# Patient Record
Sex: FEMALE | Race: WHITE | NOT HISPANIC OR LATINO | Employment: FULL TIME | URBAN - METROPOLITAN AREA
[De-identification: names, ages, dates, MRNs, and addresses within clinical notes are randomized per-mention and may not be internally consistent; named-entity substitution may affect disease eponyms.]

---

## 2017-01-03 ENCOUNTER — TELEPHONE (OUTPATIENT)
Dept: GASTROENTEROLOGY | Facility: CLINIC | Age: 62
End: 2017-01-03

## 2017-01-03 NOTE — TELEPHONE ENCOUNTER
----- Message from Bello Broussard MD sent at 1/1/2017 11:38 AM CST -----  Sandi - please tell Christen that her EGD pathology was benign but shows Covarrubias's esophagus and take her PPI as directed and follow up GI clinic once year for Covarrubias's and PPI labs.    SPECIMEN  1) Duodenum, cold biopsy.  2) Stomach, cold biopsy.  3) Distal esophagus at 35 cm, cold biopsy.  4) Esophagus at 33 cm, cold biopsy.  5) Esophagus at 31 cm, cold biopsy.  6) Esophagus at 29 cm, cold biopsy.    FINAL PATHOLOGIC DIAGNOSIS    1. DUODENUM (BIOPSY):  -Duodenal mucosa with no significant pathologic changes  -Negative for active inflammation  -No features of sprue  -No organisms    2. STOMACH (BIOPSY):  -Antrum and body mucosa with no significant pathologic changes  -Negative for active inflammation  -Negative for Helicobacter pylori organisms on H&E stain    3. DISTAL ESOPHAGUS, 35 CM (BIOPSY):  -Gastric-type mucosa with intestinal metaplasia  -Negative for dysplasia    4. DISTAL ESOPHAGUS, 33 CM (BIOPSY):  -Gastric-type mucosa with intestinal metaplasia  -Negative for dysplasia    5. DISTAL ESOPHAGUS, 31 CM (BIOPSY):  -Gastric-type mucosa with intestinal metaplasia  -Squamous mucosa (separate piece) with features of reflux esophagitis  -Negative for dysplasia    6. DISTAL ESOPHAGUS, 29 CM (BIOPSY):  -Gastric-type mucosa with intestinal metaplasia  -Squamous mucosa (separate piece) with features of reflux esophagitis  -Negative for dysplasia    Diagnosed by: Awilda Mendez M.D.

## 2017-01-23 DIAGNOSIS — D50.9 IRON DEFICIENCY ANEMIA: ICD-10-CM

## 2017-01-23 RX ORDER — FERROUS SULFATE 325(65) MG
1 TABLET ORAL EVERY 12 HOURS
Qty: 60 TABLET | Refills: 1 | Status: SHIPPED | OUTPATIENT
Start: 2017-01-23 | End: 2017-01-23 | Stop reason: SDUPTHER

## 2017-01-23 RX ORDER — FERROUS SULFATE 325(65) MG
1 TABLET ORAL EVERY 12 HOURS
Qty: 60 TABLET | Refills: 1 | Status: SHIPPED | OUTPATIENT
Start: 2017-01-23 | End: 2017-08-04 | Stop reason: SDUPTHER

## 2017-03-21 DIAGNOSIS — D50.9 IRON DEFICIENCY ANEMIA: ICD-10-CM

## 2017-03-22 RX ORDER — FERROUS SULFATE 325(65) MG
1 TABLET ORAL EVERY 12 HOURS
Qty: 60 TABLET | Refills: 3 | Status: SHIPPED | OUTPATIENT
Start: 2017-03-22 | End: 2020-04-29

## 2017-07-31 DIAGNOSIS — D50.9 IRON DEFICIENCY ANEMIA: ICD-10-CM

## 2017-07-31 RX ORDER — FERROUS SULFATE 325(65) MG
TABLET ORAL
Qty: 60 TABLET | OUTPATIENT
Start: 2017-07-31

## 2017-08-05 RX ORDER — FERROUS SULFATE 325(65) MG
1 TABLET ORAL EVERY 12 HOURS
Qty: 60 TABLET | Refills: 1 | Status: SHIPPED | OUTPATIENT
Start: 2017-08-05 | End: 2017-11-22 | Stop reason: SDUPTHER

## 2017-11-22 ENCOUNTER — TELEPHONE (OUTPATIENT)
Dept: PHARMACY | Facility: HOSPITAL | Age: 62
End: 2017-11-22

## 2017-11-22 NOTE — TELEPHONE ENCOUNTER
Informed patient [facility] received a prescription for Enbrel and we will contact their insurance company to find out if the medication is covered. We will update patient of status as more information is received. feel free to give us a call with  any questions at [phone].

## 2017-12-12 NOTE — TELEPHONE ENCOUNTER
FOR DOCUMENTATION ONLY:  Previous Prior authorization on file for Enbrel expires 3/26/18.   $0 copay

## 2018-01-23 ENCOUNTER — TELEPHONE (OUTPATIENT)
Dept: PHARMACY | Facility: CLINIC | Age: 63
End: 2018-01-23

## 2018-02-20 ENCOUNTER — TELEPHONE (OUTPATIENT)
Dept: PHARMACY | Facility: CLINIC | Age: 63
End: 2018-02-20

## 2018-03-20 ENCOUNTER — TELEPHONE (OUTPATIENT)
Dept: PHARMACY | Facility: CLINIC | Age: 63
End: 2018-03-20

## 2018-03-20 NOTE — TELEPHONE ENCOUNTER
Refill readiness for Enbrel confirmed with patient; name/ confirmed; no missed doses; no new medications; no side effects noted; address confirmed for  shipment and  delivery.

## 2018-03-23 PROBLEM — D50.9 IRON (FE) DEFICIENCY ANEMIA: Status: ACTIVE | Noted: 2018-03-23

## 2018-04-12 ENCOUNTER — TELEPHONE (OUTPATIENT)
Dept: PHARMACY | Facility: CLINIC | Age: 63
End: 2018-04-12

## 2018-05-11 NOTE — TELEPHONE ENCOUNTER
FOR DOCUMENTATION:  Enbrel renewal prior authorization approved.   5/11/18 through 12/31/18  Case ID: [id number]

## 2018-05-15 ENCOUNTER — TELEPHONE (OUTPATIENT)
Dept: PHARMACY | Facility: CLINIC | Age: 63
End: 2018-05-15

## 2018-06-11 ENCOUNTER — TELEPHONE (OUTPATIENT)
Dept: PHARMACY | Facility: CLINIC | Age: 63
End: 2018-06-11

## 2018-07-10 ENCOUNTER — TELEPHONE (OUTPATIENT)
Dept: PHARMACY | Facility: CLINIC | Age: 63
End: 2018-07-10

## 2018-08-06 ENCOUNTER — TELEPHONE (OUTPATIENT)
Dept: PHARMACY | Facility: CLINIC | Age: 63
End: 2018-08-06

## 2018-08-06 NOTE — TELEPHONE ENCOUNTER
Enbrel refill. $0 (004). Address confirmed. Shipping out 8/7/18.   F/U: No issues with administration. Rotating injection sites. Storage reviewed. Using sharps container, not in need of a new one at this time. No side effects to report. Injects every Wednesday.

## 2018-08-31 ENCOUNTER — TELEPHONE (OUTPATIENT)
Dept: PHARMACY | Facility: CLINIC | Age: 63
End: 2018-08-31

## 2018-09-27 ENCOUNTER — TELEPHONE (OUTPATIENT)
Dept: PHARMACY | Facility: CLINIC | Age: 63
End: 2018-09-27

## 2018-10-23 ENCOUNTER — TELEPHONE (OUTPATIENT)
Dept: PHARMACY | Facility: CLINIC | Age: 63
End: 2018-10-23

## 2018-11-20 ENCOUNTER — TELEPHONE (OUTPATIENT)
Dept: PHARMACY | Facility: CLINIC | Age: 63
End: 2018-11-20

## 2018-12-20 ENCOUNTER — TELEPHONE (OUTPATIENT)
Dept: PHARMACY | Facility: CLINIC | Age: 63
End: 2018-12-20

## 2019-02-13 ENCOUNTER — TELEPHONE (OUTPATIENT)
Dept: PHARMACY | Facility: CLINIC | Age: 64
End: 2019-02-13

## 2019-03-12 ENCOUNTER — TELEPHONE (OUTPATIENT)
Dept: PHARMACY | Facility: CLINIC | Age: 64
End: 2019-03-12

## 2019-03-18 NOTE — TELEPHONE ENCOUNTER
Patient called to refill Enbrel.  Patient confirmed she has 2 doses left, her next injection is 3/20.  Ship 3/25 for 3/26 delivery.  Copay $0.00  in 004.  Patient confirmed no new meds, allergies, or health conditions.  Verified address.  Declined [facility] . [address]

## 2019-04-16 ENCOUNTER — TELEPHONE (OUTPATIENT)
Dept: PHARMACY | Facility: CLINIC | Age: 64
End: 2019-04-16

## 2019-05-22 ENCOUNTER — TELEPHONE (OUTPATIENT)
Dept: PHARMACY | Facility: CLINIC | Age: 64
End: 2019-05-22

## 2019-06-19 ENCOUNTER — TELEPHONE (OUTPATIENT)
Dept: PHARMACY | Facility: CLINIC | Age: 64
End: 2019-06-19

## 2019-07-17 PROBLEM — I35.1 AORTIC VALVE REGURGITATION: Status: ACTIVE | Noted: 2019-07-17

## 2019-07-17 PROBLEM — G47.30 SLEEP APNEA: Status: ACTIVE | Noted: 2019-07-17

## 2019-10-09 ENCOUNTER — TELEPHONE (OUTPATIENT)
Dept: PHARMACY | Facility: CLINIC | Age: 64
End: 2019-10-09

## 2019-10-09 NOTE — TELEPHONE ENCOUNTER
Incoming call for Enbrel refill and follow up. Pt confirmed shipping of Enbrel on 10/10 to arrive on 10/11. Address and  verified. $0 copay in 004. Pt is not in need of a sharps container at this time.  Pt has 0 doses on hand, next dose due 10/16. Pt reported no missed doses. Pt denies any injection site reactions or side effects. Pt did not start any new medications. Pt had no further questions or concerns.

## 2019-10-30 ENCOUNTER — TELEPHONE (OUTPATIENT)
Dept: PHARMACY | Facility: CLINIC | Age: 64
End: 2019-10-30

## 2019-11-05 ENCOUNTER — TELEPHONE (OUTPATIENT)
Dept: PHARMACY | Facility: CLINIC | Age: 64
End: 2019-11-05

## 2019-12-04 ENCOUNTER — TELEPHONE (OUTPATIENT)
Dept: PHARMACY | Facility: CLINIC | Age: 64
End: 2019-12-04

## 2019-12-24 ENCOUNTER — OFFICE VISIT (OUTPATIENT)
Dept: URGENT CARE | Facility: CLINIC | Age: 64
End: 2019-12-24
Payer: MEDICARE

## 2019-12-24 VITALS
WEIGHT: 178 LBS | OXYGEN SATURATION: 96 % | DIASTOLIC BLOOD PRESSURE: 77 MMHG | TEMPERATURE: 97 F | HEART RATE: 74 BPM | RESPIRATION RATE: 20 BRPM | BODY MASS INDEX: 34.95 KG/M2 | HEIGHT: 60 IN | SYSTOLIC BLOOD PRESSURE: 167 MMHG

## 2019-12-24 DIAGNOSIS — M54.50 ACUTE MIDLINE LOW BACK PAIN WITHOUT SCIATICA: ICD-10-CM

## 2019-12-24 DIAGNOSIS — S13.9XXA NECK SPRAIN, INITIAL ENCOUNTER: Primary | ICD-10-CM
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
## 2019-12-24 PROCEDURE — 72100 X-RAY EXAM L-S SPINE 2/3 VWS: CPT | Mod: S$GLB,,, | Performed by: RADIOLOGY

## 2019-12-24 PROCEDURE — 96372 PR INJECTION,THERAP/PROPH/DIAG2ST, IM OR SUBCUT: ICD-10-PCS | Mod: S$GLB,,, | Performed by: FAMILY MEDICINE

## 2019-12-24 PROCEDURE — 72100 XR LUMBAR SPINE 2 OR 3 VIEWS: ICD-10-PCS | Mod: S$GLB,,, | Performed by: RADIOLOGY

## 2019-12-24 PROCEDURE — 72040 X-RAY EXAM NECK SPINE 2-3 VW: CPT | Mod: S$GLB,,, | Performed by: RADIOLOGY

## 2019-12-24 PROCEDURE — 96372 THER/PROPH/DIAG INJ SC/IM: CPT | Mod: S$GLB,,, | Performed by: FAMILY MEDICINE

## 2019-12-24 PROCEDURE — 99214 OFFICE O/P EST MOD 30 MIN: CPT | Mod: 25,S$GLB,, | Performed by: FAMILY MEDICINE

## 2019-12-24 PROCEDURE — 72040 XR CERVICAL SPINE 2 OR 3 VIEWS: ICD-10-PCS | Mod: S$GLB,,, | Performed by: RADIOLOGY

## 2019-12-24 PROCEDURE — 99214 PR OFFICE/OUTPT VISIT, EST, LEVL IV, 30-39 MIN: ICD-10-PCS | Mod: 25,S$GLB,, | Performed by: FAMILY MEDICINE

## 2019-12-24 RX ORDER — KETOROLAC TROMETHAMINE 30 MG/ML
30 INJECTION, SOLUTION INTRAMUSCULAR; INTRAVENOUS ONCE
Status: COMPLETED | OUTPATIENT
Start: 2019-12-24 | End: 2019-12-24

## 2019-12-24 RX ORDER — MELOXICAM 7.5 MG/1
7.5 TABLET ORAL DAILY
Qty: 20 TABLET | Refills: 0 | Status: SHIPPED | OUTPATIENT
Start: 2019-12-24 | End: 2020-01-13

## 2019-12-24 RX ORDER — CHLORZOXAZONE 500 MG/1
500 TABLET ORAL 4 TIMES DAILY PRN
Qty: 40 TABLET | Refills: 0 | Status: SHIPPED | OUTPATIENT
Start: 2019-12-24 | End: 2020-01-03

## 2019-12-24 RX ORDER — TRAMADOL HYDROCHLORIDE 50 MG/1
50 TABLET ORAL EVERY 6 HOURS PRN
Qty: 20 TABLET | Refills: 0 | Status: SHIPPED | OUTPATIENT
Start: 2019-12-24 | End: 2020-04-29

## 2019-12-24 RX ADMIN — KETOROLAC TROMETHAMINE 30 MG: 30 INJECTION, SOLUTION INTRAMUSCULAR; INTRAVENOUS at 01:12

## 2019-12-24 NOTE — PATIENT INSTRUCTIONS
Please drink plenty of fluids.  Please get plenty of rest.  Please return here or go to the Emergency Department for any concerns or worsening of condition.  If you were prescribed a narcotic medication, do not drive or operate heavy equipment or machinery while taking these medications.  If you were not prescribed an anti-inflammatory medication, and if you do not have any history of stomach/intestinal ulcers, or kidney disease, or are not taking a blood thinner such as Coumadin, Plavix, Pradaxa, Eloquis, or Xaralta for example, it is OK to take over the counter Ibuprofen or Advil or Motrin or Aleve as directed.  Do not take these medications on an empty stomach.  If you lose control of your bowel and/or bladder, please go to the nearest Emergency Department immediately.  If you lose sensation in between your legs by your genitalia and/or rectum, please go to the nearest Emergency Department immediately.  If you lose control or sensation of any extremity, please go to the nearest Emergency Department immediately.    Moist heat (heating Pad) several times a day to back for relief and comfort.  If you  smoke, please stop smoking.    Please follow up with your primary care doctor or specialist as needed.  Manuel Alvarez MD  [phone]    You must understand that you have received treatment at an Urgent Care facility only, and that you may be  released before all of your medical problems are known or treated. Urgent Care facilities are not equipped to  handle life threatening emergencies. It is recommended that you seek care at an Emergency Department for  further evaluation of worsening or concerning symptoms, or possibly life threatening conditions as  Discussed.    General Neck and Back Pain    Both neck and back pain are usually caused by injury to the muscles or ligaments of the spine. Sometimes the disks that separate each bone of the spine may cause pain by pressing on a nearby nerve. Back and neck pain may  appear after a sudden twisting or bending force (such as in a car accident), or sometimes after a simple awkward movement. In either case, muscle spasm is often present and adds to the pain.  Acute neck and back pain usually gets better in 1 to 2 weeks. Pain related to disk disease, arthritis in the spinal joints or spinal stenosis (narrowing of the spinal canal) can become chronic and last for months or years.  Back and neck pain are common problems. Most people feel better in 1 or 2 weeks, and most of the rest in 1 to 2 months. Most people can remain active.  People experience and describe pain differently.  · Pain can be sharp, stabbing, shooting, aching, cramping, or burning  · Movement, standing, bending, lifting, sitting, or walking may worsen the pain  · Pain can be localized to one spot or area, or it can be more generalized  · Pain can spread or radiate upwards, downwards, to the front, or go down your arms  · Muscle spasm may occur.  Most of the time mechanical problems with the muscles or spine cause the pain. it is usually caused by an injury, whether known or not, to the muscles or ligaments. While illnesses can cause back pain, it is usually not caused by a serious illness. Pain is usually related to physical activity, whether sports, exercise, work, or normal activity. Sometimes it can occur without an identifiable cause. This can happen simply by stretching or moving wrong, without noting pain at the time. Other causes include:  · Overexertion, lifting, pushing, pulling incorrectly or too aggressively.  · Sudden twisting, bending or stretching from an accident (car or fall), or accidental movement.  · Poor posture  · Poor conditioning, lack of regular exercise  · Spinal disc disease or arthritis  · Stress  · Pregnancy, or illness like appendicitis, bladder or kidney infection, pelvic infections   Home care  · For neck pain: Use a comfortable pillow that supports the head and keeps the spine in a  neutral position. The position of the head should not be tilted forward or backward.  · When in bed, try to find a position of comfort. A firm mattress is best. Try lying flat on your back with pillows under your knees. You can also try lying on your side with your knees bent up towards your chest and a pillow between your knees.  · At first, do not try to stretch out the sore spots. If there is a strain, it is not like the good soreness you get after exercising without an injury. In this case, stretching may make it worse.  · Avoid prolonged sitting, long car rides or travel. This puts more stress on the lower back than standing or walking.  · During the first 24 to 72 hours after an injury, apply an ice pack to the painful area for 20 minutes and then remove it for 20 minutes over a period of 60 to 90 minutes or several times a day.   · You can alternate ice and heat therapies. Talk with your healthcare provider about the best treatment for your back or neck pain. As a safety precaution, do not use a heating pad at bedtime. Sleeping with a heating pad can lead to skin burns or tissue damage.  · Therapeutic massage can help relax the back and neck muscles without stretching them.  · Be aware of safe lifting methods and do not lift anything over 15 pounds until all the pain is gone.  Medications  Talk to your healthcare provider before using medicine, especially if you have other medical problems or are taking other medicines.  · You may use over-the-counter medicine to control pain, unless another pain medicine was prescribed. If you have chronic conditions like diabetes, liver or kidney disease, stomach ulcers,  gastrointestinal bleeding, or are taking blood thinner medicines.  · Be careful if you are given pain medicines, narcotics, or medicine for muscle spasm. They can cause drowsiness, and can affect your coordination, reflexes, and judgment. Do not drive or operate heavy machinery.  Follow-up care  Follow up  with your healthcare provider, or as advised. Physical therapy or further tests may be needed.  If X-rays were taken, you will be notified of any new findings that may affect your care.  Call 911  Seek emergency medical care if any of the following occur:  · Trouble breathing  · Confusion  · Very drowsy or trouble awakening  · Fainting or loss of consciousness  · Rapid or very slow heart rate  · Loss of bowel or bladder control  When to seek medical advice  Call your healthcare provider right away if any of these occur:  · Pain becomes worse or spreads into your arms or legs  · Weakness, numbness or pain in one or both arms or legs  · Numbness in the groin area  · Difficulty walking  · Fever of 100.4ºF (38ºC) or higher, or as directed by your healthcare provider  Date Last Reviewed: 7/1/2016 © 2000-2016 [employer]. [address] [place] [id number]. All rights reserved. This information is not intended as a substitute for professional medical care. Always follow your healthcare professional's instructions.      Back Pain (Acute or Chronic)    Back pain is one of the most common problems. The good news is that most people feel better in 1 to 2 weeks, and most of the rest in 1 to 2 months. Most people can remain active.  People experience and describe pain differently; not everyone is the same.  · The pain can be sharp, stabbing, shooting, aching, cramping or burning.  · Movement, standing, bending, lifting, sitting, or walking may worsen pain.  · It can be localized to one spot or area, or it can be more generalized.  · It can spread or radiate upwards, to the front, or go down your arms or legs (sciatica).  · It can cause muscle spasm.  Most of the time, mechanical problems with the muscles or spine cause the pain. Mechanical problems are usually caused by an injury to the muscles or ligaments. While illness can cause back pain, it is usually not caused by a serious illness. Mechanical  problems include:   · Physical activity such as sports, exercise, work, or normal activity  · Overexertion, lifting, pushing, pulling incorrectly or too aggressively  · Sudden twisting, bending, or stretching from an accident, or accidental movement  · Poor posture  · Stretching or moving wrong, without noticing pain at the time  · Poor coordination, lack of regular exercise (check with your doctor about this)  · Spinal disc disease or arthritis  · Stress  Pain can also be related to pregnancy, or illness like appendicitis, bladder or kidney infections, pelvic infections, and many other things.  Acute back pain usually gets better in 1 to 2 weeks. Back pain related to disk disease, arthritis in the spinal joints or spinal stenosis (narrowing of the spinal canal) can become chronic and last for months or years.  Unless you had a physical injury (for example, a car accident or fall) X-rays are usually not needed for the initial evaluation of back pain. If pain continues and does not respond to medical treatment, X-rays and other tests may be needed.  Home care  Try these home care recommendations:  · When in bed, try to find a position of comfort. A firm mattress is best. Try lying flat on your back with pillows under your knees. You can also try lying on your side with your knees bent up towards your chest and a pillow between your knees.  · At first, do not try to stretch out the sore spots. If there is a strain, it is not like the good soreness you get after exercising without an injury. In this case, stretching may make it worse.  · Avoid prolong sitting, long car rides, or travel. This puts more stress on the lower back than standing or walking.  · During the first 24 to 72 hours after an acute injury or flare up of chronic back pain, apply an ice pack to the painful area for 20 minutes and then remove it for 20 minutes. Do this over a period of 60 to 90 minutes or several times a day. This will reduce swelling  and pain. Wrap the ice pack in a thin towel or plastic to protect your skin.  · You can start with ice, then switch to heat. Heat (hot shower, hot bath, or heating pad) reduces pain and works well for muscle spasms. Heat can be applied to the painful area for 20 minutes then remove it for 20 minutes. Do this over a period of 60 to 90 minutes or several times a day. Do not sleep on a heating pad. It can lead to skin burns or tissue damage.  · You can alternate ice and heat therapy. Talk with your doctor about the best treatment for your back pain.  · Therapeutic massage can help relax the back muscles without stretching them.  · Be aware of safe lifting methods and do not lift anything without stretching first.  Medicines  Talk to your doctor before using medicine, especially if you have other medical problems or are taking other medicines.  · You may use over-the-counter medicine as directed on the bottle to control pain, unless another pain medicine was prescribed. If you have chronic conditions like diabetes, liver or kidney disease, stomach ulcers, or gastrointestinal bleeding, or are taking blood thinners, talk to your doctor before taking any medicine.  · Be careful if you are given a prescription medicines, narcotics, or medicine for muscle spasms. They can cause drowsiness, affect your coordination, reflexes, and judgement. Do not drive or operate heavy machinery.  Follow-up care  Follow up with your healthcare provider, or as advised.   A radiologist will review any X-rays that were taken. Your provide will notify you of any new findings that may affect your care.  Call 911  Call emergency services if any of the following occur:  · Trouble breathing  · Confusion  · Very drowsy or trouble awakening  · Fainting or loss of consciousness  · Rapid or very slow heart rate  · Loss of bowel or bladder control  When to seek medical advice  Call your healthcare provider right away if any of these occur:   · Pain  becomes worse or spreads to your legs  · Weakness or numbness in one or both legs  · Numbness in the groin or genital area  Date Last Reviewed: 7/1/2016 © 2000-2016 [employer], "[employer]. [address] [place] [address], [place] [address]. All rights reserved. This information is not intended as a substitute for professional medical care. Always follow your healthcare professional's instructions.      Back Care Tips    Caring for your back  These are things you can do to prevent a recurrence of acute back pain and to reduce symptoms from chronic back pain:  · Maintain a healthy weight. If you are overweight, losing weight will help most types of back pain.  · Exercise is an important part of recovery from most types of back pain. The muscles behind and in front of the spine support the back. This means strengthening both the back muscles and the abdominal muscles will provide better support for your spine.   · Swimming and brisk walking are good overall exercises to improve your fitness level.  · Practice safe lifting methods (below).  · Practice good posture when sitting, standing and walking. Avoid prolonged sitting. This puts more stress on the lower back than standing or walking.  · Wear quality shoes with sufficient arch support. Foot and ankle alignment can affect back symptoms. Women should avoid wearing high heels.  · Therapeutic massage can help relax the back muscles without stretching them.  · During the first 24 to 72 hours after an acute injury or flare-up of chronic back pain, apply an ice pack to the painful area for 20 minutes and then remove it for 20 minutes, over a period of 60 to 90 minutes, or several times a day. As a safety precaution, do not use a heating pad at bedtime. Sleeping on a heating pad can lead to skin burns or tissue damage.  · You can alternate ice and heat therapies.  Medications  Talk to your healthcare provider before using medicines, especially if you have other medical problems  or are taking other medicines.  · You may use acetaminophen or ibuprofen to control pain, unless your healthcare provider prescribed other pain medicine. If you have chronic conditions like diabetes, liver or kidney disease, stomach ulcers, or gastrointestinal bleeding, or are taking blood thinners, talk with your healthcare provider before taking any medicines.  · Be careful if you are given prescription pain medicines, narcotics, or medicine for muscle spasm. They can cause drowsiness, affect your coordination, reflexes, and judgment. Do not drive or operate heavy machinery while taking these types of medicines. Take prescription pain medicine only as prescribed by your healthcare provider.  Lumbar stretch  Here is a simple stretching exercise that will help relax muscle spasm and keep your back more limber. If exercise makes your back pain worse, dont do it.  · Lie on your back with your knees bent and both feet on the ground.  · Slowly raise your left knee to your chest as you flatten your lower back against the floor. Hold for 5 seconds.  · Relax and repeat the exercise with your right knee.  · Do 10 of these exercises for each leg.  Safe lifting method  · Dont bend over at the waist to lift an object off the floor.  Instead, bend your knees and hips in a squat.   · Keep your back and head upright  · Hold the object close to your body, directly in front of you.  · Straighten your legs to lift the object.   · Lower the object to the floor in the reverse fashion.  · If you must slide something across the floor, push it.  Posture tips  Sitting  Sit in chairs with straight backs or low-back support. Keep your knees lower than your hips, with your feet flat on the floor.  When driving, sit up straight. Adjust the seat forward so you are not leaning toward the steering wheel.  A small pillow or rolled towel behind your lower back may help if you are driving long distances.   Standing  When standing for long  periods, shift most of your weight to one leg at a time. Alternate legs every few minutes.   Sleeping  The best way to sleep is on your side with your knees bent. Put a low pillow under your head to support your neck in a neutral spine position. Avoid thick pillows that bend your neck to one side. Put a pillow between your legs to further relax your lower back. If you sleep on your back, put pillows under your knees to support your legs in a slightly flexed position. Use a firm mattress. If your mattress sags, replace it, or use a 1/2-inch plywood board under the mattress to add support.  Follow-up care  Follow up with your healthcare provider, or as advised.  If X-rays, a CT scan or an MRI scan were taken, they will be reviewed by a radiologist. You will be notified of any new findings that may affect your care.  Call 911  Seek emergency medical care if any of the following occur:  · Trouble breathing  · Confusion  · Very drowsy  · Fainting or loss of consciousness  · Rapid or very slow heart rate  · Loss of  bowel or bladder control  When to seek medical care  Call your healthcare provider if any of the following occur:  · Pain becomes worse or spreads to your arms or legs  · Weakness or numbness in one or both arms or legs  · Numbness in the groin area  Date Last Reviewed: 6/1/2016  © 4373-5775 [employer]. [address] [place], [place] [id number]. All rights reserved. This information is not intended as a substitute for professional medical care. Always follow your healthcare professional's instructions.              Neck Sprain or Strain  A sudden force that causes turning or bending of the neck can cause sprain or strain. An example would be the force from a car accident. This can stretch or tear muscles called a strain. It can also stretch or tear ligaments called a sprain. Either of these can cause neck pain. Sometimes neck pain occurs after a simple awkward movement. In either case, muscle  spasm is commonly present and contributes to the pain.     Unless you had a forceful physical injury (for example, a car accident or fall), X-rays are usually not ordered for the initial evaluation of neck pain. If pain continues and dose not respond to medical treatment, X-rays and other tests may be performed at a later time.  Home care  · You may feel more soreness and spasm the first few days after the injury. Rest until symptoms begin to improve.  · When lying down, use a comfortable pillow or a rolled towel that supports the head and keeps the spine in a neutral position. The position of the head should not be tilted forward or backward.  · Apply an ice pack over the injured area for 15 to 20 minutes every 3 to 6 hours. You should do this for the first 24 to 48 hours. You can make an ice pack by filling a plastic bag that seals at the top with ice cubes and then wrapping it with a thin towel. After 48 hours, apply heat (warm shower or warm bath) for 15 to 20 minutes several times a day, or alternate ice and heat.  · You may use over-the-counter pain medicine to control pain, unless another pain medicine was prescribed. If you have chronic liver or kidney disease or ever had a stomach ulcer or GI bleeding, talk with your healthcare provider before using these medicines.  · If a soft cervical collar was prescribed, it should be worn only for periods of increased pain. It should not be worn for more than 3 hours a day, or for a period longer than 1 to 2 weeks.  Follow-up care  Follow up with your healthcare provider as directed. Physical therapy may be needed.  Sometimes fractures dont show up on the first X-ray. Bruises and sprains can sometimes hurt as much as a fracture. These injuries can take time to heal completely. If your symptoms dont improve or they get worse, talk with your healthcare provider. You may need a repeat X-ray or other tests. If X-rays were taken, you will be told of any new findings that  may affect your care.  Call 911  Call 911 if you have:  · Neck swelling, difficulty or painful swallowing  · Difficulty breathing  · Chest pain  When to seek medical advice  Call your healthcare provider right away if any of these occur:  · Pain becomes worse or spreads into your arms  · Weakness or numbness in one or both arms  Date Last Reviewed: 11/19/2015 © 2000-2017 [employer]. [address] [place] [address], [place] [address]. All rights reserved. This information is not intended as a substitute for professional medical care. Always follow your healthcare professional's instructions.      Motor Vehicle Accident: General Precautions  Strong forces may be involved in a car accident. It is important to watch for any new symptoms that may signal hidden injury.  It is normal to feel sore and tight in your muscles and back the next day, and not just the muscles you initially injured. Remember, all the parts of your body are connected, so while initially one area hurts, the next day another may hurt. Also, when you injure yourself, it causes inflammation, which then causes the muscles to tighten up and hurt more. After the initial worsening, it should gradually improve over the next few days. However, more severe pain should be reported.  Even without a definite head injury, you can still get a concussion from your head suddenly jerking forward, backward or sideways when falling. Concussions and even bleeding can still occur, especially if you have had a recent injury or take blood thinner. It is common to have a mild headache and feel tired and even nauseous or dizzy.  A motor vehicle accident, even a minor one, can be very stressful and cause emotional or mental symptoms after the event. These may include:  · General sense of anxiety and fear  · Recurring thoughts or nightmares about the accident  · Trouble sleeping or changes in appetite  · Feeling depressed, sad or low in energy  · Irritable or easily  upset  · Feeling the need to avoid activities, places or people that remind you of the accident  In most cases, these are normal reactions and are not severe enough to get in the way of your usual activities. These feelings usually go away within a few days, or sometimes after a few weeks.  Home care  Muscle pain, sprains and strains  Even if you have no visible injury, it is not unusual to be sore all over, and have new aches and pains the first couple of days after an accident. Take it easy at first, and don't over do it.   · Initially, do not try to stretch out the sore spots. If there is a strain, stretching may make it worse. Massage may help relax the muscles without stretching them.  · You can use an ice pack or cold compress on and off to the sore spots 10 to 20 minutes at a time, as often as you feel comfortable. This may help reduce the inflammation, swelling and pain.  You can make an ice pack by wrapping a plastic bag of ice cubes or crushed ice in a thin towel or using a bag of frozen peas or corn.  Wound care  · If you have any scrapes or abrasions, they usually heal within 10 days. It is important to keep the abrasions clean while they first start to heal. However, an infection may occur even with proper care, so watch for early signs of infection such as:  ¨ Increasing redness or swelling around the wound  ¨ Increased warmth of the wound  ¨ Red streaking lines away from the wound  ¨ Draining pus  Medications  · Talk to your doctor before taking new medicines, especially if you have other medical problems or are taking other medicines.  · If you need anything for pain, you can take acetaminophen or ibuprofen, unless you were given a different pain medicine to use. Talk with your doctor before using these medicines if you have chronic liver or kidney disease, or ever had a stomach ulcer or gastrointestinal bleeding, or are taking blood thinner medicines.  · Be careful if you are given prescription pain  medicines, narcotics, or medicine for muscle spasm. They can make you sleepy, dizzy and can affect your coordination, reflexes and judgment. Do not drive or do work where you can injure yourself when taking them.  Follow-up care  Follow up with your healthcare provider, or as advised. If emotional or mental symptoms last more than 3 weeks, follow up with your doctor. You may have a more serious traumatic stress reaction. There are treatments that can help.  If X-rays or CT scans were done, you will be notified if there are any concerns that affect your treatment.  Call 911  Call 911 if any of these occur:  · Trouble breathing  · Confused or difficulty arousing  · Fainting or loss of consciousness  · Rapid heart rate  · Trouble with speech or vision, weakness of an arm or leg  · Trouble walking or talking, loss of balance, numbness or weakness in one side of your body, facial droop  When to seek medical advice  Call your healthcare provider right away if any of the following occur:  · New or worsening headache or vision problems  · New or worsening neck, back, abdomen, arm or leg pain  · Nausea or vomiting  · Dizziness or vertigo  · Redness, swelling, or pus coming from any wound  Date Last Reviewed: 11/5/2015  © 8261-6915 [employer]. [address] [place] [address], [place] [address]. All rights reserved. This information is not intended as a substitute for professional medical care. Always follow your healthcare professional's instructions.
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
## 2019-12-27 ENCOUNTER — TELEPHONE (OUTPATIENT)
Dept: URGENT CARE | Facility: CLINIC | Age: 64
End: 2019-12-27

## 2019-12-27 NOTE — TELEPHONE ENCOUNTER
Called PT about visit on 12/24/19- PT said she is still feeling pain in her lower back, shoulders, and neck. She says she also still has a headache. She said as soon as the holidays are over she will be following up with her primary doctor.

## 2020-01-03 ENCOUNTER — TELEPHONE (OUTPATIENT)
Dept: PHARMACY | Facility: CLINIC | Age: 65
End: 2020-01-03
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
## 2020-02-26 ENCOUNTER — TELEPHONE (OUTPATIENT)
Dept: PHARMACY | Facility: CLINIC | Age: 65
End: 2020-02-26

## 2020-02-26 NOTE — TELEPHONE ENCOUNTER
Incoming call regarding Enbrel from [facility]. Shipping out Enbrel on  for  arrival with patients consent. Copay of $0 @ 004. Address and  confirmed.

## 2020-03-24 ENCOUNTER — TELEPHONE (OUTPATIENT)
Dept: PHARMACY | Facility: CLINIC | Age: 65
End: 2020-03-24

## 2020-03-24 NOTE — TELEPHONE ENCOUNTER
Confirmed Enbrel shipment 3/25 to arrive to patient home 3/26. Patient stated that she had one dose on hand for this Wednesday, 3/25 injection and no missed doses in the past month. Address and  verified. $0 copay (004).

## 2020-04-21 ENCOUNTER — TELEPHONE (OUTPATIENT)
Dept: PHARMACY | Facility: CLINIC | Age: 65
End: 2020-04-21

## 2020-05-19 ENCOUNTER — PATIENT MESSAGE (OUTPATIENT)
Dept: PHARMACY | Facility: CLINIC | Age: 65
End: 2020-05-19

## 2020-05-19 ENCOUNTER — TELEPHONE (OUTPATIENT)
Dept: PHARMACY | Facility: CLINIC | Age: 65
End: 2020-05-19

## 2020-06-09 ENCOUNTER — TELEPHONE (OUTPATIENT)
Dept: GASTROENTEROLOGY | Facility: CLINIC | Age: 65
End: 2020-06-09

## 2020-06-09 NOTE — TELEPHONE ENCOUNTER
----- Message from Davey Mercado sent at 6/8/2020  5:45 PM CDT -----  Contact: Patient  Patient called in and wanted to speak with the office regarding a prescription. She would like a call back from the office and can be reached at    [phone]

## 2020-06-15 ENCOUNTER — TELEPHONE (OUTPATIENT)
Dept: PHARMACY | Facility: CLINIC | Age: 65
End: 2020-06-15

## 2020-06-15 NOTE — TELEPHONE ENCOUNTER
Pt confirmed shipping of Enbrel on  to arrive on 20. Address and  verified. $0 copay in 004. Pt does not need sharps container at this time. Pt has 2 doses on hand, next dose due Wednesday, 20. Pt reported no missed doses. Pt did not start any new medications. Pt had no further questions or concerns.

## 2020-07-09 ENCOUNTER — TELEPHONE (OUTPATIENT)
Dept: PHARMACY | Facility: CLINIC | Age: 65
End: 2020-07-09

## 2020-08-05 ENCOUNTER — TELEPHONE (OUTPATIENT)
Dept: PHARMACY | Facility: CLINIC | Age: 65
End: 2020-08-05

## 2020-09-09 ENCOUNTER — TELEPHONE (OUTPATIENT)
Dept: PHARMACY | Facility: CLINIC | Age: 65
End: 2020-09-09

## 2020-10-08 NOTE — TELEPHONE ENCOUNTER
Refill call regarding Enbrel from [facility]...Patient reached and informed of copay of 0.00 @004. Patients next dose is scheduled for 10/14. Shipping out 10/12 for 10/13 arrival with patients consent. Address and  confirmed. Patient has 0 doses on hand at this time. Patient has not started any new medications, has had no missed doses and no side effects present. Patient is currently taking the medication as directed by doctors instruction. Patient states they do not have any questions or concerns at this time.  Patient requested sharps container.

## 2020-12-03 ENCOUNTER — SPECIALTY PHARMACY (OUTPATIENT)
Dept: PHARMACY | Facility: CLINIC | Age: 65
End: 2020-12-03

## 2020-12-03 NOTE — TELEPHONE ENCOUNTER
Specialty Pharmacy - Refill Coordination    Specialty Medication Orders Linked to Encounter      Most Recent Value   Medication #1  ENBREL SURECLICK 50 mg/mL (1 mL) PnIj (Order#[id number], Rx#[id number])          Refill Questions - Documented Responses      Most Recent Value   Relationship to patient of person spoken to?  Self   HIPAA/medical authority confirmed?  Yes   Any changes in contact preferences or allowed representatives?  No   Has the patient had any insurance changes?  No   Has the patient had any changes to specialty medication, dose, or instructions?  No   Has the patient started taking any new medications, herbals, or supplements?  No   Has the patient been diagnosed with any new medical conditions?  No   Does the patient have any new allergies to medications or foods?  No   Does the patient have any concerns about side effects?  No   Can the patient store medication/sharps container properly (at the correct temperature, away from children/pets, etc.)?  Yes   Can the patient call emergency services (911) in the event of an emergency?  Yes   Does the patient have any concerns or questions about taking or administering this medication as prescribed?  No   How many doses did the patient miss in the past 4 weeks or since the last fill?  0   How many doses does the patient have on hand?  0   How many days does the patient report on hand quantity will last?  0   Does the number of doses/days supply remaining match pharmacy expected amounts?  Yes   Does the patient feel that this medication is effective?  Yes   During the past 4 weeks, has patient missed any activities due to condition or medication?  No   During the past 4 weeks, did patient have any of the following urgent care visits?  None   How will the patient receive the medication?  Mail   When does the patient need to receive the medication?  12/09/20   Shipping Address  Home   Address in [facility] confirmed and updated if neccessary?  Yes    Expected Copay ($)  28   Is the patient able to afford the medication copay?  Yes   Payment Method  zero copay   Days supply of Refill  28   Would patient like to speak to a pharmacist?  No   Do you want to trigger an intervention?  No   Do you want to trigger an additional referral task?  No   Refill activity completed?  Yes   Refill activity plan  Refill scheduled   Shipment/Pickup Date:  12/07/20          Current Outpatient Medications   Medication Sig    alendronate (FOSAMAX) 35 MG tablet Take 1 tablet (35 mg total) by mouth every 7 days.    alprazolam (XANAX) 1 MG tablet TAKE ONE TABLET BY MOUTH EVERY EVENING AT BEDTIME    aspirin 81 MG Chew Take 1 tablet (81 mg total) by mouth once daily.    atorvastatin (LIPITOR) 80 MG tablet Take 1 tablet (80 mg total) by mouth nightly.    diclofenac (VOLTAREN) 75 MG EC tablet Take 1 tablet (75 mg total) by mouth 2 (two) times daily.    ENBREL SURECLICK 50 mg/mL (1 mL) PnIj Inject 1 mL (50 mg total) into the skin once a week.    ERGOCALCIFEROL, VITAMIN D2, (VITAMIN D ORAL) Take 800 Int'l Units/day by mouth once daily.     folic acid (FOLVITE) 1 MG tablet Take 3 tablets (3 mg total) by mouth once daily.    isosorbide mononitrate (IMDUR) 60 MG 24 hr tablet TAKE ONE TABLET BY MOUTH DAILY    lisinopriL 10 MG tablet Take 1 tablet (10 mg total) by mouth every evening.    methotrexate 2.5 MG Tab Take 4 tablets (10 mg total) by mouth every 7 days.    metoprolol tartrate (LOPRESSOR) 50 MG tablet Take 1 tablet (50 mg total) by mouth 2 (two) times daily.    nitroGLYCERIN (NITROSTAT) 0.4 MG SL tablet Place 1 tablet (0.4 mg total) under the tongue every 5 (five) minutes as needed for Chest pain.    pantoprazole (PROTONIX) 40 MG tablet Take 1 tablet (40 mg total) by mouth every 12 (twelve) hours.    sulfaSALAzine (AZULFIDINE) 500 mg Tab TAKE TWO TABLETS BY MOUTH TWO TIMES A DAY   Last reviewed on 11/28/2020 10:18 AM by Marisela Johnson RN    Review of patient's allergies  indicates:  No Known Allergies Last reviewed on  11/28/2020 10:18 AM by Marisela Johnson      Tasks added this encounter   No tasks added.   Tasks due within next 3 months   12/2/2020 - Refill Call  12/4/2020 - Clinical - Follow Up Assesement (90 day)     Maribell Jaimes  [facility]  [address] [place] [id number]  Phone: [phone]  Fax: [phone]

## 2020-12-04 ENCOUNTER — SPECIALTY PHARMACY (OUTPATIENT)
Dept: PHARMACY | Facility: CLINIC | Age: 65
End: 2020-12-04

## 2020-12-30 ENCOUNTER — SPECIALTY PHARMACY (OUTPATIENT)
Dept: PHARMACY | Facility: CLINIC | Age: 65
End: 2020-12-30

## 2020-12-30 ENCOUNTER — PATIENT MESSAGE (OUTPATIENT)
Dept: PHARMACY | Facility: CLINIC | Age: 65
End: 2020-12-30

## 2020-12-30 NOTE — TELEPHONE ENCOUNTER
Specialty Pharmacy - Refill Coordination    Specialty Medication Orders Linked to Encounter      Most Recent Value   Medication #1  ENBREL SURECLICK 50 mg/mL (1 mL) PnIj (Order#[id number], Rx#[id number])          Refill Questions - Documented Responses      Most Recent Value   Relationship to patient of person spoken to?  Self   HIPAA/medical authority confirmed?  Yes   Any changes in contact preferences or allowed representatives?  No   Has the patient had any insurance changes?  No   Has the patient had any changes to specialty medication, dose, or instructions?  No   Has the patient started taking any new medications, herbals, or supplements?  No   Has the patient been diagnosed with any new medical conditions?  No   Does the patient have any new allergies to medications or foods?  No   Does the patient have any concerns about side effects?  No   Can the patient store medication/sharps container properly (at the correct temperature, away from children/pets, etc.)?  Yes   Can the patient call emergency services (911) in the event of an emergency?  Yes   Does the patient have any concerns or questions about taking or administering this medication as prescribed?  No   How many doses did the patient miss in the past 4 weeks or since the last fill?  0   How many doses does the patient have on hand?  0   How many days does the patient report on hand quantity will last?  0   Does the number of doses/days supply remaining match pharmacy expected amounts?  Yes   Does the patient feel that this medication is effective?  Yes   During the past 4 weeks, has patient missed any activities due to condition or medication?  No   During the past 4 weeks, did patient have any of the following urgent care visits?  None   How will the patient receive the medication?  Mail   When does the patient need to receive the medication?  01/06/21   Shipping Address  Home   Address in [facility] confirmed and updated if neccessary?  Yes    Expected Copay ($)  0   Is the patient able to afford the medication copay?  Yes   Payment Method  zero copay   Days supply of Refill  28   Would patient like to speak to a pharmacist?  No   Do you want to trigger an intervention?  No   Do you want to trigger an additional referral task?  No   Refill activity completed?  Yes   Refill activity plan  Refill scheduled   Shipment/Pickup Date:  01/04/21          Current Outpatient Medications   Medication Sig    alendronate (FOSAMAX) 35 MG tablet Take 1 tablet (35 mg total) by mouth every 7 days.    alprazolam (XANAX) 1 MG tablet TAKE ONE TABLET BY MOUTH EVERY EVENING AT BEDTIME    aspirin 81 MG Chew Take 1 tablet (81 mg total) by mouth once daily.    atorvastatin (LIPITOR) 80 MG tablet Take 1 tablet (80 mg total) by mouth nightly.    diclofenac (VOLTAREN) 75 MG EC tablet Take 1 tablet (75 mg total) by mouth 2 (two) times daily.    ENBREL SURECLICK 50 mg/mL (1 mL) PnIj Inject 1 mL (50 mg total) into the skin once a week.    ERGOCALCIFEROL, VITAMIN D2, (VITAMIN D ORAL) Take 800 Int'l Units/day by mouth once daily.     folic acid (FOLVITE) 1 MG tablet Take 3 tablets (3 mg total) by mouth once daily.    isosorbide mononitrate (IMDUR) 60 MG 24 hr tablet TAKE ONE TABLET BY MOUTH DAILY    lisinopriL 10 MG tablet Take 1 tablet (10 mg total) by mouth every evening.    methotrexate 2.5 MG Tab Take 4 tablets (10 mg total) by mouth every 7 days.    metoprolol tartrate (LOPRESSOR) 50 MG tablet Take 1 tablet (50 mg total) by mouth 2 (two) times daily.    nitroGLYCERIN (NITROSTAT) 0.4 MG SL tablet Place 1 tablet (0.4 mg total) under the tongue every 5 (five) minutes as needed for Chest pain.    pantoprazole (PROTONIX) 40 MG tablet Take 1 tablet (40 mg total) by mouth every 12 (twelve) hours.    sulfaSALAzine (AZULFIDINE) 500 mg Tab TAKE TWO TABLETS BY MOUTH TWO TIMES A DAY   Last reviewed on 11/28/2020 10:18 AM by Marisela Johnson RN    Review of patient's allergies  indicates:  No Known Allergies Last reviewed on  11/28/2020 10:18 AM by Marisela Johnson      Tasks added this encounter   No tasks added.   Tasks due within next 3 months   12/26/2020 - Refill Call (Auto Added)     Maribell Jaimes  [facility]  [address] [place] [address]  Phone: [phone]  Fax: [phone]

## 2021-01-28 ENCOUNTER — SPECIALTY PHARMACY (OUTPATIENT)
Dept: PHARMACY | Facility: CLINIC | Age: 66
End: 2021-01-28

## 2021-02-23 ENCOUNTER — SPECIALTY PHARMACY (OUTPATIENT)
Dept: PHARMACY | Facility: CLINIC | Age: 66
End: 2021-02-23

## 2021-03-24 ENCOUNTER — SPECIALTY PHARMACY (OUTPATIENT)
Dept: PHARMACY | Facility: CLINIC | Age: 66
End: 2021-03-24

## 2021-04-21 ENCOUNTER — SPECIALTY PHARMACY (OUTPATIENT)
Dept: PHARMACY | Facility: CLINIC | Age: 66
End: 2021-04-21

## 2021-05-19 ENCOUNTER — SPECIALTY PHARMACY (OUTPATIENT)
Dept: PHARMACY | Facility: CLINIC | Age: 66
End: 2021-05-19

## 2021-06-16 ENCOUNTER — SPECIALTY PHARMACY (OUTPATIENT)
Dept: PHARMACY | Facility: CLINIC | Age: 66
End: 2021-06-16

## 2021-07-14 ENCOUNTER — SPECIALTY PHARMACY (OUTPATIENT)
Dept: PHARMACY | Facility: CLINIC | Age: 66
End: 2021-07-14

## 2021-08-11 ENCOUNTER — SPECIALTY PHARMACY (OUTPATIENT)
Dept: PHARMACY | Facility: CLINIC | Age: 66
End: 2021-08-11

## 2021-08-25 PROBLEM — R06.02 SOB (SHORTNESS OF BREATH): Status: ACTIVE | Noted: 2021-08-25

## 2021-09-10 ENCOUNTER — SPECIALTY PHARMACY (OUTPATIENT)
Dept: PHARMACY | Facility: CLINIC | Age: 66
End: 2021-09-10

## 2021-10-05 ENCOUNTER — SPECIALTY PHARMACY (OUTPATIENT)
Dept: PHARMACY | Facility: CLINIC | Age: 66
End: 2021-10-05

## 2021-11-02 ENCOUNTER — SPECIALTY PHARMACY (OUTPATIENT)
Dept: PHARMACY | Facility: CLINIC | Age: 66
End: 2021-11-02
Payer: MEDICARE

## 2021-11-30 PROBLEM — E87.6 HYPOKALEMIA: Status: ACTIVE | Noted: 2021-11-30

## 2021-12-01 ENCOUNTER — SPECIALTY PHARMACY (OUTPATIENT)
Dept: PHARMACY | Facility: CLINIC | Age: 66
End: 2021-12-01
Payer: MEDICARE

## 2021-12-14 PROBLEM — R19.8 PERFORATED ABDOMINAL VISCUS: Status: ACTIVE | Noted: 2021-12-14

## 2022-01-26 ENCOUNTER — SPECIALTY PHARMACY (OUTPATIENT)
Dept: PHARMACY | Facility: CLINIC | Age: 67
End: 2022-01-26
Payer: MEDICARE

## 2022-01-27 NOTE — TELEPHONE ENCOUNTER
Specialty Pharmacy - Refill Coordination    Specialty Medication Orders Linked to Encounter    Flowsheet Row Most Recent Value   Medication #1 etanercept (ENBREL SURECLICK) 50 mg/mL (1 mL) (Order#[id number], Rx#[id number])          Refill Questions - Documented Responses    Flowsheet Row Most Recent Value   Patient Availability and HIPAA Verification    Does patient want to proceed with activity? Yes   HIPAA/medical authority confirmed? Yes   Relationship to patient of person spoken to? Self   Refill Screening Questions    Changes to allergies? No   Changes to medications? No   New conditions since last clinic visit? No   Unplanned office visit, urgent care, ED, or hospital admission in the last 4 weeks? No   How does patient/caregiver feel medication is working? Excellent   Financial problems or insurance changes? No   How many doses of your specialty medications were missed in the last 4 weeks? 0   Would patient like to speak to a pharmacist? No   When does the patient need to receive the medication? 02/02/22   Refill Delivery Questions    How will the patient receive the medication? Delivery Radha   When does the patient need to receive the medication? 02/02/22   Shipping Address Home   Address in [facility] confirmed and updated if neccessary? Yes   Expected Copay ($) 9.85   Is the patient able to afford the medication copay? Yes   Payment Method CC on file   Days supply of Refill 28   Supplies needed? No supplies needed   Refill activity completed? Yes   Refill activity plan Refill scheduled   Shipment/Pickup Date: 01/28/22          Current Outpatient Medications   Medication Sig    alendronate (FOSAMAX) 35 MG tablet Take 1 tablet (35 mg total) by mouth once a week.    alprazolam (XANAX) 1 MG tablet Take 1 mg by mouth nightly.    atorvastatin (LIPITOR) 80 MG tablet Take 1 tablet (80 mg total) by mouth nightly.    ERGOCALCIFEROL, VITAMIN D2, (VITAMIN D ORAL) Take 800 Int'l Units/day by mouth once  daily.     etanercept (ENBREL SURECLICK) 50 mg/mL (1 mL) Inject 1 mL (50 mg total) into the skin once a week.    ferrous sulfate (FEOSOL) Tab tablet Take 1 tablet (1 each total) by mouth once daily.    ferrous sulfate (FEOSOL) Tab tablet Take 1 tablet (1 each total) by mouth daily with breakfast.    folic acid (FOLVITE) 1 MG tablet TAKE THREE TABLETS BY MOUTH ONCE DAILY (Patient taking differently: Take 3 mg by mouth once daily.)    isosorbide mononitrate (IMDUR) 60 MG 24 hr tablet Take 1 tablet (60 mg total) by mouth once daily.    lisinopriL 10 MG tablet Take 1 tablet (10 mg total) by mouth every evening.    methotrexate 2.5 MG Tab TAKE SIX TABLETS BY MOUTH EVERY 7 DAYS (Patient taking differently: Take 10 mg by mouth every 7 days.)    metoprolol tartrate (LOPRESSOR) 50 MG tablet Take 1 tablet (50 mg total) by mouth 2 (two) times daily.    nitroGLYCERIN (NITROSTAT) 0.4 MG SL tablet Place 1 tablet (0.4 mg total) under the tongue every 5 (five) minutes as needed for Chest pain.    pantoprazole (PROTONIX) 40 MG tablet Take 40 mg by mouth 2 (two) times daily.    sulfaSALAzine (AZULFIDINE) 500 mg Tab TAKE TWO TABLETS BY MOUTH TWO TIMES A DAY (Patient taking differently: Take 1,000 mg by mouth 2 (two) times a day.)    timoloL (BETIMOL) 0.5 % ophthalmic solution Place 1 drop into both eyes 2 (two) times daily.   Last reviewed on 12/14/2021  5:16 PM by Erna Soto RN    Review of patient's allergies indicates:  No Known Allergies Last reviewed on  12/14/2021 5:11 PM by Erna Soto      Tasks added this encounter   No tasks added.   Tasks due within next 3 months   1/26/2022 - Refill Call (Auto Added)     Mali DavisonD  [facility]  [address] [place] [id number]  Phone: [phone]  Fax: [phone]

## 2022-02-23 ENCOUNTER — PATIENT MESSAGE (OUTPATIENT)
Dept: PHARMACY | Facility: CLINIC | Age: 67
End: 2022-02-23
Payer: MEDICARE

## 2022-02-23 ENCOUNTER — SPECIALTY PHARMACY (OUTPATIENT)
Dept: PHARMACY | Facility: CLINIC | Age: 67
End: 2022-02-23
Payer: MEDICARE

## 2022-02-23 NOTE — TELEPHONE ENCOUNTER
Specialty Pharmacy - Refill Coordination    Specialty Medication Orders Linked to Encounter    Flowsheet Row Most Recent Value   Medication #1 etanercept (ENBREL SURECLICK) 50 mg/mL (1 mL) (Order#[id number], Rx#[id number])          Refill Questions - Documented Responses    Flowsheet Row Most Recent Value   Patient Availability and HIPAA Verification    Does patient want to proceed with activity? Yes   HIPAA/medical authority confirmed? Yes   Relationship to patient of person spoken to? Self   Refill Screening Questions    Changes to allergies? No   Changes to medications? No   New conditions since last clinic visit? No   Unplanned office visit, urgent care, ED, or hospital admission in the last 4 weeks? No   How does patient/caregiver feel medication is working? Excellent   Financial problems or insurance changes? No   How many doses of your specialty medications were missed in the last 4 weeks? 0   Would patient like to speak to a pharmacist? No   When does the patient need to receive the medication? 03/02/22   Refill Delivery Questions    How will the patient receive the medication? Delivery Radha   When does the patient need to receive the medication? 03/02/22   Shipping Address Home   Address in [facility] confirmed and updated if neccessary? Yes   Expected Copay ($) 9.85   Is the patient able to afford the medication copay? Yes   Payment Method CC on file   Days supply of Refill 28   Supplies needed? No supplies needed   Refill activity completed? Yes   Refill activity plan Refill scheduled   Shipment/Pickup Date: 02/25/22          Current Outpatient Medications   Medication Sig    alendronate (FOSAMAX) 35 MG tablet Take 1 tablet (35 mg total) by mouth once a week.    alprazolam (XANAX) 1 MG tablet Take 1 mg by mouth nightly.    atorvastatin (LIPITOR) 80 MG tablet Take 1 tablet (80 mg total) by mouth nightly.    ERGOCALCIFEROL, VITAMIN D2, (VITAMIN D ORAL) Take 800 Int'l Units/day by mouth once  daily.     etanercept (ENBREL SURECLICK) 50 mg/mL (1 mL) Inject 1 mL (50 mg total) into the skin once a week.    ferrous sulfate (FEOSOL) Tab tablet Take 1 tablet (1 each total) by mouth once daily.    ferrous sulfate (FEOSOL) Tab tablet Take 1 tablet (1 each total) by mouth daily with breakfast.    folic acid (FOLVITE) 1 MG tablet TAKE THREE TABLETS BY MOUTH ONCE DAILY (Patient taking differently: Take 3 mg by mouth once daily.)    isosorbide mononitrate (IMDUR) 60 MG 24 hr tablet Take 1 tablet (60 mg total) by mouth once daily.    lisinopriL 10 MG tablet Take 1 tablet (10 mg total) by mouth every evening.    methotrexate 2.5 MG Tab Take 6 tablets (15 mg total) by mouth every 7 days.    metoprolol tartrate (LOPRESSOR) 50 MG tablet Take 1 tablet (50 mg total) by mouth 2 (two) times daily.    nitroGLYCERIN (NITROSTAT) 0.4 MG SL tablet Place 1 tablet (0.4 mg total) under the tongue every 5 (five) minutes as needed for Chest pain.    pantoprazole (PROTONIX) 40 MG tablet Take 40 mg by mouth 2 (two) times daily.    sulfaSALAzine (AZULFIDINE) 500 mg Tab TAKE TWO TABLETS BY MOUTH TWO TIMES A DAY (Patient taking differently: Take 1,000 mg by mouth 2 (two) times a day.)    timoloL (BETIMOL) 0.5 % ophthalmic solution Place 1 drop into both eyes 2 (two) times daily.   Last reviewed on 12/14/2021  5:16 PM by [place]na Soto RN    Review of patient's allergies indicates:  No Known Allergies Last reviewed on  12/14/2021 5:11 PM by [place]na Soto      Tasks added this encounter   3/23/2022 - Refill Call (Auto Added)   Tasks due within next 3 months   No tasks due.     Gail Fairbanks, PharmD  [facility] - Specialty Pharmacy  [address] [place] [address]  Phone: [phone]  Fax: [phone]

## 2022-03-22 ENCOUNTER — SPECIALTY PHARMACY (OUTPATIENT)
Dept: PHARMACY | Facility: CLINIC | Age: 67
End: 2022-03-22
Payer: MEDICARE

## 2022-03-22 NOTE — TELEPHONE ENCOUNTER
Specialty Pharmacy - Refill Coordination    Specialty Medication Orders Linked to Encounter    Flowsheet Row Most Recent Value   Medication #1 etanercept (ENBREL SURECLICK) 50 mg/mL (1 mL) (Order#[id number], Rx#[id number])          Refill Questions - Documented Responses    Flowsheet Row Most Recent Value   Patient Availability and HIPAA Verification    Does patient want to proceed with activity? Yes   HIPAA/medical authority confirmed? Yes   Relationship to patient of person spoken to? Self   Refill Screening Questions    Changes to allergies? No   Changes to medications? No   New conditions since last clinic visit? No   Unplanned office visit, urgent care, ED, or hospital admission in the last 4 weeks? No   How does patient/caregiver feel medication is working? Very good   Financial problems or insurance changes? No   How many doses of your specialty medications were missed in the last 4 weeks? 0   Would patient like to speak to a pharmacist? No   When does the patient need to receive the medication? 03/30/22   Refill Delivery Questions    How will the patient receive the medication? Delivery Radah   When does the patient need to receive the medication? 03/30/22   Shipping Address Home   Address in [facility] confirmed and updated if neccessary? Yes   Expected Copay ($) 0   Is the patient able to afford the medication copay? Yes   Payment Method zero copay   Days supply of Refill 28   Supplies needed? No supplies needed   Refill activity completed? Yes   Refill activity plan Refill scheduled   Shipment/Pickup Date: 03/24/22          Current Outpatient Medications   Medication Sig    alendronate (FOSAMAX) 35 MG tablet Take 1 tablet (35 mg total) by mouth once a week.    alprazolam (XANAX) 1 MG tablet Take 1 mg by mouth nightly.    atorvastatin (LIPITOR) 80 MG tablet Take 1 tablet (80 mg total) by mouth nightly.    ERGOCALCIFEROL, VITAMIN D2, (VITAMIN D ORAL) Take 800 Int'l Units/day by mouth once daily.      etanercept (ENBREL SURECLICK) 50 mg/mL (1 mL) Inject 1 mL (50 mg total) into the skin once a week.    ferrous sulfate (FEOSOL) Tab tablet Take 1 tablet (1 each total) by mouth once daily.    ferrous sulfate (FEOSOL) Tab tablet Take 1 tablet (1 each total) by mouth daily with breakfast.    folic acid (FOLVITE) 1 MG tablet TAKE THREE TABLETS BY MOUTH ONCE DAILY (Patient taking differently: Take 3 mg by mouth once daily.)    isosorbide mononitrate (IMDUR) 60 MG 24 hr tablet Take 1 tablet (60 mg total) by mouth once daily.    lisinopriL 10 MG tablet Take 1 tablet (10 mg total) by mouth every evening.    methotrexate 2.5 MG Tab Take 6 tablets (15 mg total) by mouth every 7 days.    metoprolol tartrate (LOPRESSOR) 50 MG tablet Take 1 tablet (50 mg total) by mouth 2 (two) times daily.    nitroGLYCERIN (NITROSTAT) 0.4 MG SL tablet Place 1 tablet (0.4 mg total) under the tongue every 5 (five) minutes as needed for Chest pain.    pantoprazole (PROTONIX) 40 MG tablet Take 40 mg by mouth 2 (two) times daily.    sulfaSALAzine (AZULFIDINE) 500 mg Tab TAKE TWO TABLETS BY MOUTH TWO TIMES A DAY (Patient taking differently: Take 1,000 mg by mouth 2 (two) times a day.)    timoloL (BETIMOL) 0.5 % ophthalmic solution Place 1 drop into both eyes 2 (two) times daily.   Last reviewed on 12/14/2021  5:16 PM by [place]na Soto RN    Review of patient's allergies indicates:  No Known Allergies Last reviewed on  12/14/2021 5:11 PM by [place]na Soto      Tasks added this encounter   4/20/2022 - Refill Call (Auto Added)   Tasks due within next 3 months   No tasks due.     Cristina Vasquez, PharmD  [facility] - Specialty Pharmacy  [address] [place] [id number]  Phone: [phone]  Fax: [phone]

## 2022-04-18 ENCOUNTER — PATIENT MESSAGE (OUTPATIENT)
Dept: ADMINISTRATIVE | Facility: OTHER | Age: 67
End: 2022-04-18
Payer: MEDICARE

## 2022-04-20 ENCOUNTER — SPECIALTY PHARMACY (OUTPATIENT)
Dept: PHARMACY | Facility: CLINIC | Age: 67
End: 2022-04-20
Payer: MEDICARE

## 2022-04-20 NOTE — TELEPHONE ENCOUNTER
Specialty Pharmacy - Refill Coordination    Specialty Medication Orders Linked to Encounter    Flowsheet Row Most Recent Value   Medication #1 etanercept (ENBREL SURECLICK) 50 mg/mL (1 mL) (Order#[id number], Rx#[id number])          Refill Questions - Documented Responses    Flowsheet Row Most Recent Value   Patient Availability and HIPAA Verification    Does patient want to proceed with activity? Yes   HIPAA/medical authority confirmed? Yes   Relationship to patient of person spoken to? Self   Refill Screening Questions    Changes to allergies? No   Changes to medications? No   New conditions since last clinic visit? No   Unplanned office visit, urgent care, ED, or hospital admission in the last 4 weeks? No   How does patient/caregiver feel medication is working? Good   Financial problems or insurance changes? No   How many doses of your specialty medications were missed in the last 4 weeks? 0   Would patient like to speak to a pharmacist? No   When does the patient need to receive the medication? 04/27/22   Refill Delivery Questions    How will the patient receive the medication? Delivery Radha   When does the patient need to receive the medication? 04/27/22   Shipping Address Home   Address in [facility] confirmed and updated if neccessary? Yes   Expected Copay ($) 0   Is the patient able to afford the medication copay? Yes   Payment Method zero copay   Days supply of Refill 28   Supplies needed? No supplies needed   Refill activity completed? Yes   Refill activity plan Refill scheduled   Shipment/Pickup Date: 04/22/22          Current Outpatient Medications   Medication Sig    alendronate (FOSAMAX) 35 MG tablet Take 1 tablet (35 mg total) by mouth once a week.    alprazolam (XANAX) 1 MG tablet Take 1 mg by mouth nightly.    atorvastatin (LIPITOR) 80 MG tablet Take 1 tablet (80 mg total) by mouth nightly.    ERGOCALCIFEROL, VITAMIN D2, (VITAMIN D ORAL) Take 800 Int'l Units/day by mouth once daily.      etanercept (ENBREL SURECLICK) 50 mg/mL (1 mL) Inject 1 mL (50 mg total) into the skin once a week.    ferrous sulfate (FEOSOL) Tab tablet Take 1 tablet (1 each total) by mouth once daily.    ferrous sulfate (FEOSOL) Tab tablet Take 1 tablet (1 each total) by mouth daily with breakfast.    folic acid (FOLVITE) 1 MG tablet TAKE THREE TABLETS BY MOUTH ONCE DAILY (Patient taking differently: Take 3 mg by mouth once daily.)    isosorbide mononitrate (IMDUR) 60 MG 24 hr tablet Take 1 tablet (60 mg total) by mouth once daily.    lisinopriL 10 MG tablet Take 1 tablet (10 mg total) by mouth every evening.    methotrexate 2.5 MG Tab Take 4 tablets (10 mg total) by mouth every 7 days.    metoprolol tartrate (LOPRESSOR) 50 MG tablet Take 1 tablet (50 mg total) by mouth 2 (two) times daily.    nitroGLYCERIN (NITROSTAT) 0.4 MG SL tablet Place 1 tablet (0.4 mg total) under the tongue every 5 (five) minutes as needed for Chest pain.    pantoprazole (PROTONIX) 40 MG tablet Take 40 mg by mouth 2 (two) times daily.    sulfaSALAzine (AZULFIDINE) 500 mg Tab TAKE TWO TABLETS BY MOUTH TWO TIMES A DAY (Patient taking differently: Take 1,000 mg by mouth 2 (two) times a day.)    timoloL (BETIMOL) 0.5 % ophthalmic solution Place 1 drop into both eyes 2 (two) times daily.   Last reviewed on 3/23/2022 11:35 AM by Ramila Patiño MD    Review of patient's allergies indicates:  No Known Allergies Last reviewed on  3/23/2022 11:28 AM by Daily Bills      Tasks added this encounter   No tasks added.   Tasks due within next 3 months   4/20/2022 - Refill Call (Auto Added)     Saman Christian  [facility] - Specialty Pharmacy  [address] [place] [id number]  Phone: [phone]  Fax: [phone]

## 2022-05-18 ENCOUNTER — SPECIALTY PHARMACY (OUTPATIENT)
Dept: PHARMACY | Facility: CLINIC | Age: 67
End: 2022-05-18
Payer: MEDICARE

## 2022-05-18 NOTE — TELEPHONE ENCOUNTER
Specialty Pharmacy - Refill Coordination    Specialty Medication Orders Linked to Encounter    Flowsheet Row Most Recent Value   Medication #1 etanercept (ENBREL SURECLICK) 50 mg/mL (1 mL) (Order#[id number], Rx#[id number])          Refill Questions - Documented Responses    Flowsheet Row Most Recent Value   Patient Availability and HIPAA Verification    Does patient want to proceed with activity? Yes   HIPAA/medical authority confirmed? Yes   Relationship to patient of person spoken to? Self   Refill Screening Questions    Changes to allergies? No   Changes to medications? No   New conditions since last clinic visit? No   Unplanned office visit, urgent care, ED, or hospital admission in the last 4 weeks? No   How does patient/caregiver feel medication is working? Good   Financial problems or insurance changes? No   How many doses of your specialty medications were missed in the last 4 weeks? 0   Would patient like to speak to a pharmacist? No   When does the patient need to receive the medication? 05/25/22   Refill Delivery Questions    How will the patient receive the medication? Delivery Radha   When does the patient need to receive the medication? 05/25/22   Shipping Address Home   Address in [facility] confirmed and updated if neccessary? Yes   Expected Copay ($) 0   Is the patient able to afford the medication copay? Yes   Payment Method zero copay   Days supply of Refill 28   Supplies needed? No supplies needed   Refill activity completed? Yes   Refill activity plan Refill scheduled   Shipment/Pickup Date: 05/20/22          Current Outpatient Medications   Medication Sig    alendronate (FOSAMAX) 35 MG tablet Take 1 tablet (35 mg total) by mouth once a week.    alprazolam (XANAX) 1 MG tablet Take 1 mg by mouth nightly.    atorvastatin (LIPITOR) 80 MG tablet Take 1 tablet (80 mg total) by mouth nightly.    ERGOCALCIFEROL, VITAMIN D2, (VITAMIN D ORAL) Take 800 Int'l Units/day by mouth once daily.      etanercept (ENBREL SURECLICK) 50 mg/mL (1 mL) Inject 1 mL (50 mg total) into the skin once a week.    ferrous sulfate (FEOSOL) Tab tablet Take 1 tablet (1 each total) by mouth once daily.    ferrous sulfate (FEOSOL) Tab tablet Take 1 tablet (1 each total) by mouth daily with breakfast.    folic acid (FOLVITE) 1 MG tablet TAKE THREE TABLETS BY MOUTH ONCE DAILY (Patient taking differently: Take 3 mg by mouth once daily.)    isosorbide mononitrate (IMDUR) 60 MG 24 hr tablet Take 1 tablet (60 mg total) by mouth once daily.    lisinopriL 10 MG tablet Take 1 tablet (10 mg total) by mouth every evening.    methotrexate 2.5 MG Tab Take 4 tablets (10 mg total) by mouth every 7 days.    metoprolol tartrate (LOPRESSOR) 50 MG tablet Take 1 tablet (50 mg total) by mouth 2 (two) times daily.    nitroGLYCERIN (NITROSTAT) 0.4 MG SL tablet Place 1 tablet (0.4 mg total) under the tongue every 5 (five) minutes as needed for Chest pain.    pantoprazole (PROTONIX) 40 MG tablet Take 40 mg by mouth 2 (two) times daily.    sulfaSALAzine (AZULFIDINE) 500 mg Tab TAKE TWO TABLETS BY MOUTH TWO TIMES A DAY (Patient taking differently: Take 1,000 mg by mouth 2 (two) times a day.)    timoloL (BETIMOL) 0.5 % ophthalmic solution Place 1 drop into both eyes 2 (two) times daily.   Last reviewed on 3/23/2022 11:35 AM by Ramila Patiño MD    Review of patient's allergies indicates:  No Known Allergies Last reviewed on  3/23/2022 11:28 AM by Daily Bills      Tasks added this encounter   No tasks added.   Tasks due within next 3 months   5/18/2022 - Refill Call (Auto Added)     Saman Christian  [facility] - Specialty Pharmacy  [address] [place] [id number]  Phone: [phone]  Fax: [phone]

## 2022-06-13 ENCOUNTER — SPECIALTY PHARMACY (OUTPATIENT)
Dept: PHARMACY | Facility: CLINIC | Age: 67
End: 2022-06-13
Payer: MEDICARE

## 2022-06-13 NOTE — TELEPHONE ENCOUNTER
Specialty Medication Orders Linked to Encounter    Flowsheet Row Most Recent Value   Medication #1 etanercept (ENBREL SURECLICK) 50 mg/mL (1 mL) (Order#[id number], Rx#[id number])        Patient Diagnosis   M06.9 - Rheumatoid arthritis    Specialty clinical pharmacist review completed for an annual review of reassessment. Reviewed the following areas: current med list, reports of adverse effects, adherence and progress towards therapeutic goals.    Recommendations: none at this time.    Tasks added this encounter   3/13/2023 - Clinical - Follow Up Assesement (Annual)   Tasks due within next 3 months   6/15/2022 - Refill Call (Auto Added)     Adelaida García, PharmD  [facility]  [address] [facility] [address]  Phone: [phone]  Fax: [phone]

## 2022-06-15 ENCOUNTER — SPECIALTY PHARMACY (OUTPATIENT)
Dept: PHARMACY | Facility: CLINIC | Age: 67
End: 2022-06-15
Payer: MEDICARE

## 2022-06-15 ENCOUNTER — PATIENT MESSAGE (OUTPATIENT)
Dept: PHARMACY | Facility: CLINIC | Age: 67
End: 2022-06-15
Payer: MEDICARE

## 2022-06-15 NOTE — TELEPHONE ENCOUNTER
Specialty Pharmacy - Refill Coordination    Specialty Medication Orders Linked to Encounter    Flowsheet Row Most Recent Value   Medication #1 etanercept (ENBREL SURECLICK) 50 mg/mL (1 mL) (Order#[id number], Rx#[id number])          Refill Questions - Documented Responses    Flowsheet Row Most Recent Value   Patient Availability and HIPAA Verification    Does patient want to proceed with activity? Yes   HIPAA/medical authority confirmed? Yes   Relationship to patient of person spoken to? Self   Refill Screening Questions    Changes to allergies? No   Changes to medications? No   New conditions since last clinic visit? No   Unplanned office visit, urgent care, ED, or hospital admission in the last 4 weeks? No   How does patient/caregiver feel medication is working? Good   Financial problems or insurance changes? No   How many doses of your specialty medications were missed in the last 4 weeks? 0   Would patient like to speak to a pharmacist? No   When does the patient need to receive the medication? 06/22/22   Refill Delivery Questions    How will the patient receive the medication? Delivery Radha   When does the patient need to receive the medication? 06/22/22   Shipping Address Home   Address in [facility] confirmed and updated if neccessary? Yes   Expected Copay ($) 0   Is the patient able to afford the medication copay? Yes   Payment Method zero copay   Days supply of Refill 28   Supplies needed? No supplies needed   Refill activity completed? Yes   Refill activity plan Refill scheduled   Shipment/Pickup Date: 06/21/22          Current Outpatient Medications   Medication Sig    alendronate (FOSAMAX) 35 MG tablet Take 1 tablet (35 mg total) by mouth once a week.    alprazolam (XANAX) 1 MG tablet Take 1 mg by mouth nightly.    atorvastatin (LIPITOR) 80 MG tablet Take 1 tablet (80 mg total) by mouth nightly.    ERGOCALCIFEROL, VITAMIN D2, (VITAMIN D ORAL) Take 800 Int'l Units/day by mouth once daily.      etanercept (ENBREL SURECLICK) 50 mg/mL (1 mL) Inject 1 mL (50 mg total) into the skin once a week.    ferrous sulfate (FEOSOL) Tab tablet Take 1 tablet (1 each total) by mouth once daily.    ferrous sulfate (FEOSOL) Tab tablet Take 1 tablet (1 each total) by mouth daily with breakfast.    folic acid (FOLVITE) 1 MG tablet TAKE THREE TABLETS BY MOUTH ONCE DAILY (Patient taking differently: Take 3 mg by mouth once daily.)    isosorbide mononitrate (IMDUR) 60 MG 24 hr tablet Take 1 tablet (60 mg total) by mouth once daily.    lisinopriL 10 MG tablet Take 1 tablet (10 mg total) by mouth every evening.    methotrexate 2.5 MG Tab Take 4 tablets (10 mg total) by mouth every 7 days.    metoprolol tartrate (LOPRESSOR) 50 MG tablet Take 1 tablet (50 mg total) by mouth 2 (two) times daily.    nitroGLYCERIN (NITROSTAT) 0.4 MG SL tablet Place 1 tablet (0.4 mg total) under the tongue every 5 (five) minutes as needed for Chest pain.    pantoprazole (PROTONIX) 40 MG tablet Take 40 mg by mouth 2 (two) times daily.    sulfaSALAzine (AZULFIDINE) 500 mg Tab TAKE TWO TABLETS BY MOUTH TWO TIMES A DAY (Patient taking differently: Take 1,000 mg by mouth 2 (two) times a day.)    timoloL (BETIMOL) 0.5 % ophthalmic solution Place 1 drop into both eyes 2 (two) times daily.   Last reviewed on 3/23/2022 11:35 AM by Ramila Patiño MD    Review of patient's allergies indicates:  No Known Allergies Last reviewed on  3/23/2022 11:28 AM by Daily Bills      Tasks added this encounter   7/13/2022 - Refill Call (Auto Added)   Tasks due within next 3 months   No tasks due.     Paulette Garcia, PharmD  [facility] - Specialty Pharmacy  [address] [place] [address]  Phone: [phone]  Fax: [phone]

## 2022-07-13 ENCOUNTER — SPECIALTY PHARMACY (OUTPATIENT)
Dept: PHARMACY | Facility: CLINIC | Age: 67
End: 2022-07-13
Payer: MEDICARE

## 2022-07-13 NOTE — TELEPHONE ENCOUNTER
Specialty Pharmacy - Refill Coordination    Specialty Medication Orders Linked to Encounter    Flowsheet Row Most Recent Value   Medication #1 etanercept (ENBREL SURECLICK) 50 mg/mL (1 mL) (Order#[id number], Rx#[id number])          Refill Questions - Documented Responses    Flowsheet Row Most Recent Value   Patient Availability and HIPAA Verification    Does patient want to proceed with activity? Yes   HIPAA/medical authority confirmed? Yes   Relationship to patient of person spoken to? Self   Refill Screening Questions    Changes to allergies? No   Changes to medications? No   New conditions since last clinic visit? No   Unplanned office visit, urgent care, ED, or hospital admission in the last 4 weeks? No   How does patient/caregiver feel medication is working? Good   Financial problems or insurance changes? No   How many doses of your specialty medications were missed in the last 4 weeks? 0   Would patient like to speak to a pharmacist? No   When does the patient need to receive the medication? 07/20/22   Refill Delivery Questions    How will the patient receive the medication? Delivery Radha   When does the patient need to receive the medication? 07/20/22   Shipping Address Home   Address in [facility] confirmed and updated if neccessary? Yes   Expected Copay ($) 0   Is the patient able to afford the medication copay? Yes   Payment Method zero copay   Days supply of Refill 28   Supplies needed? No supplies needed   Refill activity completed? Yes   Refill activity plan Refill scheduled   Shipment/Pickup Date: 07/15/22          Current Outpatient Medications   Medication Sig    alendronate (FOSAMAX) 35 MG tablet Take 1 tablet (35 mg total) by mouth once a week.    alprazolam (XANAX) 1 MG tablet Take 1 mg by mouth nightly.    atorvastatin (LIPITOR) 80 MG tablet Take 1 tablet (80 mg total) by mouth nightly.    ERGOCALCIFEROL, VITAMIN D2, (VITAMIN D ORAL) Take 800 Int'l Units/day by mouth once daily.      etanercept (ENBREL SURECLICK) 50 mg/mL (1 mL) Inject 1 mL (50 mg total) into the skin once a week.    ferrous sulfate (FEOSOL) Tab tablet Take 1 tablet (1 each total) by mouth once daily.    ferrous sulfate (FEOSOL) Tab tablet Take 1 tablet (1 each total) by mouth daily with breakfast.    folic acid (FOLVITE) 1 MG tablet TAKE THREE TABLETS BY MOUTH ONCE DAILY (Patient taking differently: Take 3 mg by mouth once daily.)    isosorbide mononitrate (IMDUR) 60 MG 24 hr tablet Take 1 tablet (60 mg total) by mouth once daily.    lisinopriL 10 MG tablet Take 1 tablet (10 mg total) by mouth every evening.    methotrexate 2.5 MG Tab Take 4 tablets (10 mg total) by mouth every 7 days.    metoprolol tartrate (LOPRESSOR) 50 MG tablet Take 1 tablet (50 mg total) by mouth 2 (two) times daily.    nitroGLYCERIN (NITROSTAT) 0.4 MG SL tablet Place 1 tablet (0.4 mg total) under the tongue every 5 (five) minutes as needed for Chest pain.    pantoprazole (PROTONIX) 40 MG tablet Take 40 mg by mouth 2 (two) times daily.    sulfaSALAzine (AZULFIDINE) 500 mg Tab TAKE TWO TABLETS BY MOUTH TWO TIMES A DAY (Patient taking differently: Take 1,000 mg by mouth 2 (two) times a day.)    timoloL (BETIMOL) 0.5 % ophthalmic solution Place 1 drop into both eyes 2 (two) times daily.   Last reviewed on 3/23/2022 11:35 AM by Ramila Patiño MD    Review of patient's allergies indicates:  No Known Allergies Last reviewed on  3/23/2022 11:28 AM by Daily Bills      Tasks added this encounter   No tasks added.   Tasks due within next 3 months   7/13/2022 - Refill Call (Auto Added)     Saman Christian  [facility] - Specialty Pharmacy  [address] [place] [id number]  Phone: [phone]  Fax: [phone]

## 2022-07-13 NOTE — TELEPHONE ENCOUNTER
Called pt back in response to teams message regarding her mtx. Pt states she stopped MTX as noted on chart convo with her rheumatologist d/t low wbc. pt only fills Enbrel with [facility] which she is to continue. Pt advised to proceed with her rheumatologists advisement, pt states she is aware. She had no questions or concerns for [facility].

## 2022-08-10 ENCOUNTER — SPECIALTY PHARMACY (OUTPATIENT)
Dept: PHARMACY | Facility: CLINIC | Age: 67
End: 2022-08-10
Payer: MEDICARE

## 2022-08-10 NOTE — TELEPHONE ENCOUNTER
Specialty Pharmacy - Refill Coordination    Specialty Medication Orders Linked to Encounter    Flowsheet Row Most Recent Value   Medication #1 etanercept (ENBREL SURECLICK) 50 mg/mL (1 mL) (Order#[id number], Rx#[id number])          Refill Questions - Documented Responses    Flowsheet Row Most Recent Value   Patient Availability and HIPAA Verification    Does patient want to proceed with activity? Yes   HIPAA/medical authority confirmed? Yes   Relationship to patient of person spoken to? Self   Refill Screening Questions    Changes to allergies? No   Changes to medications? No   New conditions since last clinic visit? No   Unplanned office visit, urgent care, ED, or hospital admission in the last 4 weeks? No   How does patient/caregiver feel medication is working? Good   Financial problems or insurance changes? No   How many doses of your specialty medications were missed in the last 4 weeks? 0   Would patient like to speak to a pharmacist? No   When does the patient need to receive the medication? 08/17/22   Refill Delivery Questions    How will the patient receive the medication? Delivery Radha   When does the patient need to receive the medication? 08/17/22   Shipping Address Home   Address in [facility] confirmed and updated if neccessary? Yes   Expected Copay ($) 0   Is the patient able to afford the medication copay? Yes   Payment Method zero copay   Days supply of Refill 28   Supplies needed? No supplies needed   Refill activity completed? Yes   Refill activity plan Refill scheduled   Shipment/Pickup Date: 08/11/22          Current Outpatient Medications   Medication Sig    alendronate (FOSAMAX) 35 MG tablet Take 1 tablet (35 mg total) by mouth once a week.    alprazolam (XANAX) 1 MG tablet Take 1 mg by mouth nightly.    atorvastatin (LIPITOR) 80 MG tablet Take 1 tablet (80 mg total) by mouth nightly.    ERGOCALCIFEROL, VITAMIN D2, (VITAMIN D ORAL) Take 800 Int'l Units/day by mouth once daily.      etanercept (ENBREL SURECLICK) 50 mg/mL (1 mL) Inject 1 mL (50 mg total) into the skin once a week.    ferrous sulfate (FEOSOL) Tab tablet Take 1 tablet (1 each total) by mouth once daily.    ferrous sulfate (FEOSOL) Tab tablet Take 1 tablet (1 each total) by mouth daily with breakfast.    folic acid (FOLVITE) 1 MG tablet TAKE THREE TABLETS BY MOUTH ONCE DAILY (Patient taking differently: Take 3 mg by mouth once daily.)    isosorbide mononitrate (IMDUR) 60 MG 24 hr tablet Take 1 tablet (60 mg total) by mouth once daily.    lisinopriL 10 MG tablet Take 1 tablet (10 mg total) by mouth every evening.    methotrexate 2.5 MG Tab Take 4 tablets (10 mg total) by mouth every 7 days.    metoprolol tartrate (LOPRESSOR) 50 MG tablet Take 1 tablet (50 mg total) by mouth 2 (two) times daily.    nitroGLYCERIN (NITROSTAT) 0.4 MG SL tablet Place 1 tablet (0.4 mg total) under the tongue every 5 (five) minutes as needed for Chest pain.    pantoprazole (PROTONIX) 40 MG tablet Take 40 mg by mouth 2 (two) times daily.    sulfaSALAzine (AZULFIDINE) 500 mg Tab TAKE TWO TABLETS BY MOUTH TWO TIMES A DAY    timoloL (BETIMOL) 0.5 % ophthalmic solution Place 1 drop into both eyes 2 (two) times daily.   Last reviewed on 3/23/2022 11:35 AM by Ramila Patiño MD    Review of patient's allergies indicates:  No Known Allergies Last reviewed on  3/23/2022 11:28 AM by Daily Bills      Tasks added this encounter   9/7/2022 - Refill Call (Auto Added)   Tasks due within next 3 months   No tasks due.     Judith Godinez [facility] - Specialty Pharmacy  [address] [place] [id number]  Phone: [phone]  Fax: [phone]

## 2022-09-07 ENCOUNTER — SPECIALTY PHARMACY (OUTPATIENT)
Dept: PHARMACY | Facility: CLINIC | Age: 67
End: 2022-09-07
Payer: MEDICARE

## 2022-09-07 NOTE — TELEPHONE ENCOUNTER
Specialty Pharmacy - Refill Coordination    Specialty Medication Orders Linked to Encounter      Flowsheet Row Most Recent Value   Medication #1 etanercept (ENBREL SURECLICK) 50 mg/mL (1 mL) (Order#[id number], Rx#[id number])            Refill Questions - Documented Responses      Flowsheet Row Most Recent Value   Patient Availability and HIPAA Verification    Does patient want to proceed with activity? Yes   HIPAA/medical authority confirmed? Yes   Relationship to patient of person spoken to? Self   Refill Screening Questions    Changes to allergies? No   Changes to medications? No   New conditions since last clinic visit? No   Unplanned office visit, urgent care, ED, or hospital admission in the last 4 weeks? No   How does patient/caregiver feel medication is working? Good   Financial problems or insurance changes? No   How many doses of your specialty medications were missed in the last 4 weeks? 0   Would patient like to speak to a pharmacist? No   When does the patient need to receive the medication? 09/15/22   Refill Delivery Questions    How will the patient receive the medication? Delivery Radha   When does the patient need to receive the medication? 09/15/22   Shipping Address Home   Address in [facility] confirmed and updated if neccessary? Yes   Expected Copay ($) 0   Is the patient able to afford the medication copay? Yes   Payment Method zero copay   Days supply of Refill 28   Supplies needed? No supplies needed   Refill activity completed? Yes   Refill activity plan Refill scheduled   Shipment/Pickup Date: 09/08/22            Current Outpatient Medications   Medication Sig Note    alendronate (FOSAMAX) 35 MG tablet Take 1 tablet (35 mg total) by mouth once a week.     alprazolam (XANAX) 1 MG tablet Take 1 mg by mouth nightly.     atorvastatin (LIPITOR) 80 MG tablet Take 1 tablet (80 mg total) by mouth nightly.     ERGOCALCIFEROL, VITAMIN D2, (VITAMIN D ORAL) Take 800 Int'l Units/day by mouth once  daily.      etanercept (ENBREL SURECLICK) 50 mg/mL (1 mL) Inject 1 mL (50 mg total) into the skin once a week.     ferrous sulfate (FEOSOL) Tab tablet Take 1 tablet (1 each total) by mouth once daily.     ferrous sulfate (FEOSOL) Tab tablet Take 1 tablet (1 each total) by mouth daily with breakfast.     folic acid (FOLVITE) 1 MG tablet TAKE THREE TABLETS BY MOUTH ONCE DAILY (Patient taking differently: Take 3 mg by mouth once daily.) 9/7/2022: No longer taking    isosorbide mononitrate (IMDUR) 60 MG 24 hr tablet Take 1 tablet (60 mg total) by mouth once daily.     lisinopriL 10 MG tablet Take 1 tablet (10 mg total) by mouth every evening.     methotrexate 2.5 MG Tab Take 4 tablets (10 mg total) by mouth every 7 days. 9/7/2022: Patient no longer taking.     metoprolol tartrate (LOPRESSOR) 50 MG tablet Take 1 tablet (50 mg total) by mouth 2 (two) times daily.     nitroGLYCERIN (NITROSTAT) 0.4 MG SL tablet Place 1 tablet (0.4 mg total) under the tongue every 5 (five) minutes as needed for Chest pain.     pantoprazole (PROTONIX) 40 MG tablet Take 40 mg by mouth 2 (two) times daily.     sulfaSALAzine (AZULFIDINE) 500 mg Tab TAKE TWO TABLETS BY MOUTH TWO TIMES A DAY     timoloL (BETIMOL) 0.5 % ophthalmic solution Place 1 drop into both eyes 2 (two) times daily.    Last reviewed on 8/25/2022  2:34 PM by Madhav Bowers MD    Review of patient's allergies indicates:  No Known Allergies Last reviewed on  8/25/2022 2:34 PM by Madhav Bowers      Tasks added this encounter   No tasks added.   Tasks due within next 3 months   9/7/2022 - Refill Call (Auto Added)     ADELINA BRASHER, PharmD  [facility] - Specialty Pharmacy  [address] [place] [id number]  Phone: [phone]  Fax: [phone]

## 2022-10-06 ENCOUNTER — SPECIALTY PHARMACY (OUTPATIENT)
Dept: PHARMACY | Facility: CLINIC | Age: 67
End: 2022-10-06
Payer: MEDICARE

## 2022-10-06 NOTE — TELEPHONE ENCOUNTER
Specialty Pharmacy - Refill Coordination    Specialty Medication Orders Linked to Encounter      Flowsheet Row Most Recent Value   Medication #1 etanercept (ENBREL SURECLICK) 50 mg/mL (1 mL) (Order#[id number], Rx#[id number])            Refill Questions - Documented Responses      Flowsheet Row Most Recent Value   Patient Availability and HIPAA Verification    Does patient want to proceed with activity? Yes   HIPAA/medical authority confirmed? Yes   Relationship to patient of person spoken to? Self   Refill Screening Questions    Changes to allergies? No   Changes to medications? No   New conditions since last clinic visit? No   Unplanned office visit, urgent care, ED, or hospital admission in the last 4 weeks? No   How does patient/caregiver feel medication is working? Good   Financial problems or insurance changes? No   How many doses of your specialty medications were missed in the last 4 weeks? 0   Would patient like to speak to a pharmacist? No   When does the patient need to receive the medication? 10/16/22   Refill Delivery Questions    How will the patient receive the medication? MEDRx   When does the patient need to receive the medication? 10/16/22   Shipping Address Home   Address in [facility] confirmed and updated if neccessary? Yes   Expected Copay ($) 0   Is the patient able to afford the medication copay? Yes   Payment Method zero copay   Days supply of Refill 28   Supplies needed? No supplies needed   Refill activity completed? Yes   Refill activity plan Refill scheduled   Shipment/Pickup Date: 10/11/22            Current Outpatient Medications   Medication Sig    alendronate (FOSAMAX) 35 MG tablet TAKE ONE TABLET BY MOUTH ONCE A WEEK TAKE WITH EIGHT ounces OF WATER ON AN empty stomach    alprazolam (XANAX) 1 MG tablet Take 1 mg by mouth nightly.    atorvastatin (LIPITOR) 80 MG tablet Take 1 tablet (80 mg total) by mouth nightly.    ERGOCALCIFEROL, VITAMIN D2, (VITAMIN D ORAL) Take 800 Int'l  Units/day by mouth once daily.     etanercept (ENBREL SURECLICK) 50 mg/mL (1 mL) Inject 1 mL (50 mg total) into the skin once a week.    ferrous sulfate (FEOSOL) Tab tablet Take 1 tablet (1 each total) by mouth once daily.    ferrous sulfate (FEOSOL) Tab tablet Take 1 tablet (1 each total) by mouth daily with breakfast.    folic acid (FOLVITE) 1 MG tablet TAKE THREE TABLETS BY MOUTH ONCE DAILY (Patient taking differently: Take 3 mg by mouth once daily.)    hydrOXYchloroQUINE (PLAQUENIL) 200 mg tablet Take 1 tablet (200 mg total) by mouth 2 (two) times daily.    isosorbide mononitrate (IMDUR) 60 MG 24 hr tablet TAKE ONE TABLET BY MOUTH once a day    lisinopriL 10 MG tablet Take 1 tablet (10 mg total) by mouth every evening.    methotrexate 2.5 MG Tab Take 4 tablets (10 mg total) by mouth every 7 days.    metoprolol tartrate (LOPRESSOR) 50 MG tablet Take 1 tablet (50 mg total) by mouth 2 (two) times daily.    nitroGLYCERIN (NITROSTAT) 0.4 MG SL tablet Place 1 tablet (0.4 mg total) under the tongue every 5 (five) minutes as needed for Chest pain.    pantoprazole (PROTONIX) 40 MG tablet Take 40 mg by mouth 2 (two) times daily.    sulfaSALAzine (AZULFIDINE) 500 mg Tab TAKE TWO TABLETS BY MOUTH TWO TIMES A DAY    timoloL (BETIMOL) 0.5 % ophthalmic solution Place 1 drop into both eyes 2 (two) times daily.   Last reviewed on 8/25/2022  2:34 PM by Madhav Bowers MD    Review of patient's allergies indicates:  No Known Allergies Last reviewed on  9/9/2022 9:23 AM by Madhav Bowers      Tasks added this encounter   11/6/2022 - Refill Call (Auto Added)   Tasks due within next 3 months   No tasks due.     Marsha Lyons  [facility] - Specialty Pharmacy  [address] [place] [id number]  Phone: [phone]  Fax: [phone]

## 2022-11-07 ENCOUNTER — SPECIALTY PHARMACY (OUTPATIENT)
Dept: PHARMACY | Facility: CLINIC | Age: 67
End: 2022-11-07
Payer: MEDICARE

## 2022-11-07 NOTE — TELEPHONE ENCOUNTER
Specialty Pharmacy - Refill Coordination    Specialty Medication Orders Linked to Encounter      Flowsheet Row Most Recent Value   Medication #1 etanercept (ENBREL SURECLICK) 50 mg/mL (1 mL) (Order#[id number], Rx#[id number])            Refill Questions - Documented Responses      Flowsheet Row Most Recent Value   Patient Availability and HIPAA Verification    Does patient want to proceed with activity? Yes   HIPAA/medical authority confirmed? Yes   Relationship to patient of person spoken to? Self   Refill Screening Questions    Changes to allergies? No   Changes to medications? No   New conditions since last clinic visit? No   Unplanned office visit, urgent care, ED, or hospital admission in the last 4 weeks? No   How does patient/caregiver feel medication is working? Good   Financial problems or insurance changes? No   How many doses of your specialty medications were missed in the last 4 weeks? 0   Would patient like to speak to a pharmacist? No   When does the patient need to receive the medication? 11/10/22   Refill Delivery Questions    How will the patient receive the medication? MEDRx   When does the patient need to receive the medication? 11/10/22   Shipping Address Home   Address in [facility] confirmed and updated if neccessary? Yes   Expected Copay ($) 0   Is the patient able to afford the medication copay? Yes   Payment Method zero copay   Days supply of Refill 28   Supplies needed? No supplies needed   Refill activity completed? Yes   Refill activity plan Refill scheduled   Shipment/Pickup Date: 11/08/22            Current Outpatient Medications   Medication Sig    alendronate (FOSAMAX) 35 MG tablet TAKE ONE TABLET BY MOUTH ONCE A WEEK TAKE WITH EIGHT ounces OF WATER ON AN empty stomach    alprazolam (XANAX) 1 MG tablet Take 1 mg by mouth nightly.    atorvastatin (LIPITOR) 80 MG tablet TAKE ONE TABLET BY MOUTH EVERY NIGHT    ERGOCALCIFEROL, VITAMIN D2, (VITAMIN D ORAL) Take 800 Int'l Units/day  by mouth once daily.     etanercept (ENBREL SURECLICK) 50 mg/mL (1 mL) Inject 1 mL (50 mg total) into the skin once a week.    ferrous sulfate (FEOSOL) Tab tablet Take 1 tablet (1 each total) by mouth once daily.    ferrous sulfate (FEOSOL) Tab tablet Take 1 tablet (1 each total) by mouth daily with breakfast.    folic acid (FOLVITE) 1 MG tablet TAKE THREE TABLETS BY MOUTH ONCE DAILY (Patient taking differently: Take 3 mg by mouth once daily.)    hydrOXYchloroQUINE (PLAQUENIL) 200 mg tablet Take 1 tablet (200 mg total) by mouth 2 (two) times daily.    isosorbide mononitrate (IMDUR) 60 MG 24 hr tablet TAKE ONE TABLET BY MOUTH once a day    lisinopriL 10 MG tablet TAKE ONE TABLET BY MOUTH EVERY EVENING    methotrexate 2.5 MG Tab Take 4 tablets (10 mg total) by mouth every 7 days.    metoprolol tartrate (LOPRESSOR) 50 MG tablet Take 1 tablet (50 mg total) by mouth 2 (two) times daily.    nitroGLYCERIN (NITROSTAT) 0.4 MG SL tablet Place 1 tablet (0.4 mg total) under the tongue every 5 (five) minutes as needed for Chest pain.    pantoprazole (PROTONIX) 40 MG tablet Take 40 mg by mouth 2 (two) times daily.    sulfaSALAzine (AZULFIDINE) 500 mg Tab TAKE TWO TABLETS BY MOUTH TWO TIMES A DAY    timoloL (BETIMOL) 0.5 % ophthalmic solution Place 1 drop into both eyes 2 (two) times daily.   Last reviewed on 8/25/2022  2:34 PM by Madhav Bowers MD    Review of patient's allergies indicates:  No Known Allergies Last reviewed on  9/9/2022 9:23 AM by aMdhav Bowers      Tasks added this encounter   12/1/2022 - Refill Call (Auto Added)   Tasks due within next 3 months   No tasks due.     Marsha Lyons  [facility] - Specialty Pharmacy  [address] [place] [id number]  Phone: [phone]  Fax: [phone]

## 2022-12-05 ENCOUNTER — SPECIALTY PHARMACY (OUTPATIENT)
Dept: PHARMACY | Facility: CLINIC | Age: 67
End: 2022-12-05
Payer: MEDICARE

## 2022-12-05 NOTE — TELEPHONE ENCOUNTER
Specialty Pharmacy - Refill Coordination    Specialty Medication Orders Linked to Encounter      Flowsheet Row Most Recent Value   Medication #1 etanercept (ENBREL SURECLICK) 50 mg/mL (1 mL) (Order#[id number], Rx#[id number])            Refill Questions - Documented Responses      Flowsheet Row Most Recent Value   Patient Availability and HIPAA Verification    Does patient want to proceed with activity? Yes   HIPAA/medical authority confirmed? Yes   Relationship to patient of person spoken to? Self   Refill Screening Questions    Changes to allergies? No   Changes to medications? No   New conditions since last clinic visit? No   Unplanned office visit, urgent care, ED, or hospital admission in the last 4 weeks? No   How does patient/caregiver feel medication is working? Very good   Financial problems or insurance changes? No   How many doses of your specialty medications were missed in the last 4 weeks? 0   Would patient like to speak to a pharmacist? No   When does the patient need to receive the medication? 12/14/22   Refill Delivery Questions    How will the patient receive the medication? MEDRx   When does the patient need to receive the medication? 12/14/22   Shipping Address Home   Address in [facility] confirmed and updated if neccessary? Yes   Expected Copay ($) 0   Is the patient able to afford the medication copay? Yes   Payment Method zero copay   Days supply of Refill 28   Supplies needed? No supplies needed   Refill activity completed? Yes   Refill activity plan Refill scheduled   Shipment/Pickup Date: 12/07/22            Current Outpatient Medications   Medication Sig    alendronate (FOSAMAX) 35 MG tablet TAKE ONE TABLET BY MOUTH ONCE A WEEK TAKE WITH EIGHT ounces OF WATER ON AN empty stomach    alprazolam (XANAX) 1 MG tablet Take 1 mg by mouth nightly.    atorvastatin (LIPITOR) 80 MG tablet TAKE ONE TABLET BY MOUTH EVERY NIGHT    ERGOCALCIFEROL, VITAMIN D2, (VITAMIN D ORAL) Take 800 Int'l  Units/day by mouth once daily.     etanercept (ENBREL SURECLICK) 50 mg/mL (1 mL) Inject 1 mL (50 mg total) into the skin once a week.    ferrous sulfate (FEOSOL) Tab tablet Take 1 tablet (1 each total) by mouth once daily.    ferrous sulfate (FEOSOL) Tab tablet Take 1 tablet (1 each total) by mouth daily with breakfast.    folic acid (FOLVITE) 1 MG tablet TAKE THREE TABLETS BY MOUTH ONCE DAILY (Patient taking differently: Take 3 mg by mouth once daily.)    hydrOXYchloroQUINE (PLAQUENIL) 200 mg tablet Take 1 tablet (200 mg total) by mouth 2 (two) times daily.    isosorbide mononitrate (IMDUR) 60 MG 24 hr tablet TAKE ONE TABLET BY MOUTH once a day    lisinopriL 10 MG tablet TAKE ONE TABLET BY MOUTH EVERY EVENING    methotrexate 2.5 MG Tab Take 4 tablets (10 mg total) by mouth every 7 days.    metoprolol tartrate (LOPRESSOR) 50 MG tablet TAKE ONE TABLET BY MOUTH TWO TIMES A DAY    nitroGLYCERIN (NITROSTAT) 0.4 MG SL tablet Place 1 tablet (0.4 mg total) under the tongue every 5 (five) minutes as needed for Chest pain.    pantoprazole (PROTONIX) 40 MG tablet Take 40 mg by mouth 2 (two) times daily.    sulfaSALAzine (AZULFIDINE) 500 mg Tab TAKE TWO TABLETS BY MOUTH TWO TIMES A DAY    timoloL (BETIMOL) 0.5 % ophthalmic solution Place 1 drop into both eyes 2 (two) times daily.   Last reviewed on 8/25/2022  2:34 PM by Madhav Bowers MD    Review of patient's allergies indicates:  No Known Allergies Last reviewed on  9/9/2022 9:23 AM by Madhav Bowers      Tasks added this encounter   1/4/2023 - Refill Call (Auto Added)   Tasks due within next 3 months   No tasks due.     Sita Rodriguez, PharmD  [facility] - Specialty Pharmacy  [address] [place] [id number]  Phone: [phone]  Fax: [phone]

## 2023-01-04 ENCOUNTER — SPECIALTY PHARMACY (OUTPATIENT)
Dept: PHARMACY | Facility: CLINIC | Age: 68
End: 2023-01-04
Payer: MEDICARE

## 2023-01-04 NOTE — TELEPHONE ENCOUNTER
Specialty Pharmacy - Refill Coordination    Specialty Medication Orders Linked to Encounter      Flowsheet Row Most Recent Value   Medication #1 etanercept (ENBREL SURECLICK) 50 mg/mL (1 mL) (Order#[id number], Rx#[id number])            Refill Questions - Documented Responses      Flowsheet Row Most Recent Value   Patient Availability and HIPAA Verification    Does patient want to proceed with activity? Yes   HIPAA/medical authority confirmed? Yes   Relationship to patient of person spoken to? Self   Refill Screening Questions    Changes to allergies? No   Changes to medications? No   New conditions since last clinic visit? No   Unplanned office visit, urgent care, ED, or hospital admission in the last 4 weeks? No   How does patient/caregiver feel medication is working? Good   Financial problems or insurance changes? No   How many doses of your specialty medications were missed in the last 4 weeks? 0   Would patient like to speak to a pharmacist? No   When does the patient need to receive the medication? 01/11/23   Refill Delivery Questions    How will the patient receive the medication? MEDRx   When does the patient need to receive the medication? 01/11/23   Shipping Address Home   Address in [facility] confirmed and updated if neccessary? Yes   Expected Copay ($) 10.35   Is the patient able to afford the medication copay? Yes   Payment Method CC on file   Days supply of Refill 28   Supplies needed? No supplies needed   Refill activity completed? Yes   Refill activity plan Refill scheduled   Shipment/Pickup Date: 01/09/23            Current Outpatient Medications   Medication Sig    alendronate (FOSAMAX) 35 MG tablet TAKE ONE TABLET BY MOUTH ONCE A WEEK TAKE WITH EIGHT ounces OF WATER ON AN empty stomach    alprazolam (XANAX) 1 MG tablet Take 1 mg by mouth nightly.    atorvastatin (LIPITOR) 80 MG tablet TAKE ONE TABLET BY MOUTH EVERY NIGHT    ERGOCALCIFEROL, VITAMIN D2, (VITAMIN D ORAL) Take 800 Int'l  Units/day by mouth once daily.     etanercept (ENBREL SURECLICK) 50 mg/mL (1 mL) Inject 1 mL (50 mg total) into the skin once a week.    ferrous sulfate (FEOSOL) Tab tablet Take 1 tablet (1 each total) by mouth once daily.    ferrous sulfate (FEOSOL) Tab tablet Take 1 tablet (1 each total) by mouth daily with breakfast.    folic acid (FOLVITE) 1 MG tablet TAKE THREE TABLETS BY MOUTH ONCE DAILY (Patient taking differently: Take 3 mg by mouth once daily.)    hydrOXYchloroQUINE (PLAQUENIL) 200 mg tablet Take 1 tablet (200 mg total) by mouth 2 (two) times daily.    isosorbide mononitrate (IMDUR) 60 MG 24 hr tablet TAKE ONE TABLET BY MOUTH once a day    lisinopriL 10 MG tablet TAKE ONE TABLET BY MOUTH EVERY EVENING    methotrexate 2.5 MG Tab Take 4 tablets (10 mg total) by mouth every 7 days.    metoprolol tartrate (LOPRESSOR) 50 MG tablet TAKE ONE TABLET BY MOUTH TWO TIMES A DAY    nitroGLYCERIN (NITROSTAT) 0.4 MG SL tablet Place 1 tablet (0.4 mg total) under the tongue every 5 (five) minutes as needed for Chest pain.    pantoprazole (PROTONIX) 40 MG tablet Take 40 mg by mouth 2 (two) times daily.    sulfaSALAzine (AZULFIDINE) 500 mg Tab TAKE TWO TABLETS BY MOUTH TWO TIMES A DAY    timoloL (BETIMOL) 0.5 % ophthalmic solution Place 1 drop into both eyes 2 (two) times daily.   Last reviewed on 8/25/2022  2:34 PM by Madhav Bowers MD    Review of patient's allergies indicates:  No Known Allergies Last reviewed on  9/9/2022 9:23 AM by Madhav Bowers      Tasks added this encounter   2/1/2023 - Refill Call (Auto Added)  2/1/2023 - Refill Call (Auto Added)   Tasks due within next 3 months   3/13/2023 - Clinical - Follow Up Assesement (Annual)     Judith Godinez [facility] - Specialty Pharmacy  [address] [place] [id number]  Phone: [phone]  Fax: [phone]

## 2023-02-01 ENCOUNTER — SPECIALTY PHARMACY (OUTPATIENT)
Dept: PHARMACY | Facility: CLINIC | Age: 68
End: 2023-02-01
Payer: MEDICARE

## 2023-02-01 NOTE — TELEPHONE ENCOUNTER
Specialty Pharmacy - Refill Coordination    Specialty Medication Orders Linked to Encounter      Flowsheet Row Most Recent Value   Medication #1 etanercept (ENBREL SURECLICK) 50 mg/mL (1 mL) (Order#[id number], Rx#[id number])            Refill Questions - Documented Responses      Flowsheet Row Most Recent Value   Patient Availability and HIPAA Verification    Does patient want to proceed with activity? Yes   HIPAA/medical authority confirmed? Yes   Relationship to patient of person spoken to? Self   Refill Screening Questions    Changes to allergies? No   Changes to medications? No   New conditions since last clinic visit? No   Unplanned office visit, urgent care, ED, or hospital admission in the last 4 weeks? No   How does patient/caregiver feel medication is working? Good   Financial problems or insurance changes? No   How many doses of your specialty medications were missed in the last 4 weeks? 0   Would patient like to speak to a pharmacist? No   When does the patient need to receive the medication? 02/08/23   Refill Delivery Questions    How will the patient receive the medication? MEDRx   When does the patient need to receive the medication? 02/08/23   Shipping Address Home   Address in [facility] confirmed and updated if neccessary? Yes   Expected Copay ($) 10.35   Is the patient able to afford the medication copay? Yes   Payment Method CC on file   Days supply of Refill 28   Supplies needed? No supplies needed   Refill activity completed? Yes   Refill activity plan Refill scheduled   Shipment/Pickup Date: 02/06/23            Current Outpatient Medications   Medication Sig    alendronate (FOSAMAX) 35 MG tablet TAKE ONE TABLET BY MOUTH ONCE A WEEK TAKE WITH EIGHT ounces OF WATER ON AN empty stomach    alprazolam (XANAX) 1 MG tablet Take 1 mg by mouth nightly.    atorvastatin (LIPITOR) 80 MG tablet TAKE ONE TABLET BY MOUTH EVERY NIGHT    baclofen (LIORESAL) 10 MG tablet Take 1-2 tablets (10-20 mg total)  by mouth 2 (two) times daily.    ERGOCALCIFEROL, VITAMIN D2, (VITAMIN D ORAL) Take 800 Int'l Units/day by mouth once daily.     etanercept (ENBREL SURECLICK) 50 mg/mL (1 mL) Inject 1 mL (50 mg total) into the skin once a week.    ferrous sulfate (FEOSOL) Tab tablet Take 1 tablet (1 each total) by mouth once daily.    ferrous sulfate (FEOSOL) Tab tablet Take 1 tablet (1 each total) by mouth daily with breakfast.    hydrOXYchloroQUINE (PLAQUENIL) 200 mg tablet Take 1 tablet (200 mg total) by mouth 2 (two) times daily.    isosorbide mononitrate (IMDUR) 60 MG 24 hr tablet TAKE ONE TABLET BY MOUTH once a day    lisinopriL 10 MG tablet TAKE ONE TABLET BY MOUTH EVERY EVENING    metoprolol tartrate (LOPRESSOR) 50 MG tablet TAKE ONE TABLET BY MOUTH TWO TIMES A DAY    nitroGLYCERIN (NITROSTAT) 0.4 MG SL tablet Place 1 tablet (0.4 mg total) under the tongue every 5 (five) minutes as needed for Chest pain.    pantoprazole (PROTONIX) 40 MG tablet Take 40 mg by mouth 2 (two) times daily.    sulfaSALAzine (AZULFIDINE) 500 mg Tab TAKE TWO TABLETS BY MOUTH TWO TIMES A DAY   Last reviewed on 1/12/2023  9:05 AM by Madhav Bowers MD    Review of patient's allergies indicates:  No Known Allergies Last reviewed on  1/12/2023 9:05 AM by Madhav Bowers      Tasks added this encounter   3/1/2023 - Refill Call (Auto Added)   Tasks due within next 3 months   3/13/2023 - Clinical - Follow Up Assesement (Annual)     Leroy Rodriguez, MaliD  [facility] - Specialty Pharmacy  [address] [place] [id number]  Phone: [phone]  Fax: [phone]

## 2023-03-01 ENCOUNTER — SPECIALTY PHARMACY (OUTPATIENT)
Dept: PHARMACY | Facility: CLINIC | Age: 68
End: 2023-03-01
Payer: MEDICARE

## 2023-03-01 NOTE — TELEPHONE ENCOUNTER
Specialty Pharmacy - Refill Coordination    Specialty Medication Orders Linked to Encounter      Flowsheet Row Most Recent Value   Medication #1 etanercept (ENBREL SURECLICK) 50 mg/mL (1 mL) (Order#[id number], Rx#[id number])            Refill Questions - Documented Responses      Flowsheet Row Most Recent Value   Patient Availability and HIPAA Verification    Does patient want to proceed with activity? Yes   HIPAA/medical authority confirmed? Yes   Relationship to patient of person spoken to? Self   Refill Screening Questions    Changes to allergies? No   Changes to medications? No   New conditions since last clinic visit? No   Unplanned office visit, urgent care, ED, or hospital admission in the last 4 weeks? No   How does patient/caregiver feel medication is working? Very good   Financial problems or insurance changes? No   How many doses of your specialty medications were missed in the last 4 weeks? 0   Would patient like to speak to a pharmacist? No   When does the patient need to receive the medication? 03/08/23   Refill Delivery Questions    How will the patient receive the medication? MEDRx   When does the patient need to receive the medication? 03/08/23   Shipping Address Home   Address in [facility] confirmed and updated if neccessary? Yes   Expected Copay ($) 0   Is the patient able to afford the medication copay? Yes   Payment Method zero copay   Days supply of Refill 28   Supplies needed? No supplies needed   Refill activity completed? Yes   Refill activity plan Refill scheduled   Shipment/Pickup Date: 03/03/23            Current Outpatient Medications   Medication Sig    alendronate (FOSAMAX) 35 MG tablet TAKE ONE TABLET BY MOUTH ONCE A WEEK TAKE WITH EIGHT ounces OF WATER ON AN empty stomach    alprazolam (XANAX) 1 MG tablet Take 1 mg by mouth nightly.    atorvastatin (LIPITOR) 80 MG tablet TAKE ONE TABLET BY MOUTH EVERY NIGHT    baclofen (LIORESAL) 10 MG tablet Take 1-2 tablets (10-20 mg total)  by mouth 2 (two) times daily.    ERGOCALCIFEROL, VITAMIN D2, (VITAMIN D ORAL) Take 800 Int'l Units/day by mouth once daily.     etanercept (ENBREL SURECLICK) 50 mg/mL (1 mL) Inject 1 mL (50 mg total) into the skin once a week.    ferrous sulfate (FEOSOL) Tab tablet Take 1 tablet (1 each total) by mouth once daily.    ferrous sulfate (FEOSOL) Tab tablet Take 1 tablet (1 each total) by mouth daily with breakfast.    hydrOXYchloroQUINE (PLAQUENIL) 200 mg tablet Take 1 tablet (200 mg total) by mouth 2 (two) times daily.    isosorbide mononitrate (IMDUR) 60 MG 24 hr tablet Take 1 tablet (60 mg total) by mouth once daily.    lisinopriL 10 MG tablet TAKE ONE TABLET BY MOUTH EVERY EVENING    metoprolol tartrate (LOPRESSOR) 50 MG tablet TAKE ONE TABLET BY MOUTH TWO TIMES A DAY    nitroGLYCERIN (NITROSTAT) 0.4 MG SL tablet Place 1 tablet (0.4 mg total) under the tongue every 5 (five) minutes as needed for Chest pain.    pantoprazole (PROTONIX) 40 MG tablet Take 40 mg by mouth 2 (two) times daily.    sulfaSALAzine (AZULFIDINE) 500 mg Tab TAKE TWO TABLETS BY MOUTH TWO TIMES A DAY   Last reviewed on 1/12/2023  9:05 AM by Madhav Bowers MD    Review of patient's allergies indicates:  No Known Allergies Last reviewed on  1/12/2023 9:05 AM by Madhav Bowers      Tasks added this encounter   3/29/2023 - Refill Call (Auto Added)   Tasks due within next 3 months   3/13/2023 - Clinical - Follow Up Assesement (Annual)     Renetta Payan  [facility] - Specialty Pharmacy  [address] [place] [id number]  Phone: [phone]  Fax: [phone]

## 2023-03-29 ENCOUNTER — SPECIALTY PHARMACY (OUTPATIENT)
Dept: PHARMACY | Facility: CLINIC | Age: 68
End: 2023-03-29
Payer: MEDICARE

## 2023-03-29 DIAGNOSIS — M05.79 RHEUMATOID ARTHRITIS INVOLVING MULTIPLE SITES WITH POSITIVE RHEUMATOID FACTOR: Primary | ICD-10-CM

## 2023-03-29 NOTE — TELEPHONE ENCOUNTER
Specialty Pharmacy - Refill Coordination    Specialty Medication Orders Linked to Encounter      Flowsheet Row Most Recent Value   Medication #1 etanercept (ENBREL SURECLICK) 50 mg/mL (1 mL) (Order#[id number], Rx#[id number])            Refill Questions - Documented Responses      Flowsheet Row Most Recent Value   Patient Availability and HIPAA Verification    Does patient want to proceed with activity? Yes   HIPAA/medical authority confirmed? Yes   Relationship to patient of person spoken to? Self   Refill Screening Questions    Changes to allergies? No   Changes to medications? No   New conditions since last clinic visit? No   Unplanned office visit, urgent care, ED, or hospital admission in the last 4 weeks? No   How does patient/caregiver feel medication is working? Excellent   Financial problems or insurance changes? No   How many doses of your specialty medications were missed in the last 4 weeks? 0   Would patient like to speak to a pharmacist? No   When does the patient need to receive the medication? 04/05/23   Refill Delivery Questions    How will the patient receive the medication? MEDRx   When does the patient need to receive the medication? 04/05/23   Shipping Address Home   Address in [facility] confirmed and updated if neccessary? Yes   Expected Copay ($) 0   Is the patient able to afford the medication copay? Yes   Payment Method zero copay   Days supply of Refill 28   Supplies needed? --  [Inj supplies]   Refill activity completed? Yes   Refill activity plan Refill scheduled   Shipment/Pickup Date: 03/30/23            Current Outpatient Medications   Medication Sig    alendronate (FOSAMAX) 35 MG tablet TAKE ONE TABLET BY MOUTH ONCE A WEEK TAKE WITH EIGHT ounces OF WATER ON AN empty stomach    alprazolam (XANAX) 1 MG tablet Take 1 mg by mouth nightly.    atorvastatin (LIPITOR) 80 MG tablet TAKE ONE TABLET BY MOUTH EVERY NIGHT    baclofen (LIORESAL) 10 MG tablet Take 1-2 tablets (10-20 mg total)  by mouth 2 (two) times daily.    ERGOCALCIFEROL, VITAMIN D2, (VITAMIN D ORAL) Take 800 Int'l Units/day by mouth once daily.     etanercept (ENBREL SURECLICK) 50 mg/mL (1 mL) Inject 1 mL (50 mg total) into the skin once a week.    ferrous sulfate (FEOSOL) Tab tablet Take 1 tablet (1 each total) by mouth once daily.    ferrous sulfate (FEOSOL) Tab tablet Take 1 tablet (1 each total) by mouth daily with breakfast.    hydrOXYchloroQUINE (PLAQUENIL) 200 mg tablet Take 1 tablet (200 mg total) by mouth 2 (two) times daily.    isosorbide mononitrate (IMDUR) 60 MG 24 hr tablet Take 1 tablet (60 mg total) by mouth once daily.    lisinopriL 10 MG tablet TAKE ONE TABLET BY MOUTH EVERY EVENING    metoprolol tartrate (LOPRESSOR) 50 MG tablet TAKE ONE TABLET BY MOUTH TWO TIMES A DAY    nitroGLYCERIN (NITROSTAT) 0.4 MG SL tablet Place 1 tablet (0.4 mg total) under the tongue every 5 (five) minutes as needed for Chest pain.    pantoprazole (PROTONIX) 40 MG tablet Take 40 mg by mouth 2 (two) times daily.    sulfaSALAzine (AZULFIDINE) 500 mg Tab TAKE TWO TABLETS BY MOUTH TWO TIMES A DAY   Last reviewed on 1/12/2023  9:05 AM by Madhav Bowers MD    Review of patient's allergies indicates:  No Known Allergies Last reviewed on  1/12/2023 9:05 AM by Madhav Bowers      Tasks added this encounter   No tasks added.   Tasks due within next 3 months   3/29/2023 - Refill Call (Auto Added)     Freddy Sterling, PharmD  [facility] - Specialty Pharmacy  [address] [place] [address]  Phone: [phone]  Fax: [phone]

## 2023-03-29 NOTE — TELEPHONE ENCOUNTER
Specialty Pharmacy - Refill Coordination  Specialty Pharmacy - Clinical Reassessment    Specialty Medication Orders Linked to Encounter      Flowsheet Row Most Recent Value   Medication #1 etanercept (ENBREL SURECLICK) 50 mg/mL (1 mL) (Order#[id number], Rx#[id number])     Outgoing call to contact pt for Enbrel reassessment. No answer. LVM for call back. Chart reviewed.    If no call back, will follow-up on the following at the next refill:  (1) quality of life  (2) side effects         Patient Diagnosis   M06.9 - Rheumatoid arthritis    Christen Luciano is a 67 y.o. female, who is followed by the specialty pharmacy service for management and education of her RA.  She has been on therapy with Enbrel since 2015.  I have reviewed her electronic medical record and current medication list and determined that specialty medication adjustment Is not needed at this time.    Patient has not experienced adverse events.  She Is adherent reporting 0 missed doses since last review.  Adherence has been encouraged with the following mechanism(s): refill reminder calls.  She is meeting goals of therapy and will continue treatment.        3/1/2023 2/1/2023 1/4/2023 12/5/2022 11/7/2022 10/6/2022 9/7/2022   Follow Up Review   # of missed doses 0 0 0 0 0 0 0   New Medications? No No No No No No No   New Conditions? No No No No No No No   New Allergies? No No No No No No No   Med Effective? Very good Good Good Very good Good Good Good   Urgent Care? No No No No No No No   Requested Pharmacist? No No No No No No No            Therapy is appropriate to continue.    Therapy is effective: Yes  On scale of 1 to 10, how does patient rank quality of life? (10 - Best): Unable to Assess  Recommendations: none at this time.  Review Method: Chart Review    Tasks added this encounter   12/29/2023 - Clinical - Follow Up Assesement (Annual)   Tasks due within next 3 months   3/29/2023 - Refill Call (Auto Added)     Antonia Turcios, PharmD  [facility] -  Specialty Pharmacy  [facility] [address]  Phone: [phone]  Fax: [phone]

## 2023-04-27 ENCOUNTER — SPECIALTY PHARMACY (OUTPATIENT)
Dept: PHARMACY | Facility: CLINIC | Age: 68
End: 2023-04-27
Payer: MEDICARE

## 2023-04-27 NOTE — TELEPHONE ENCOUNTER
Specialty Pharmacy - Refill Coordination    Specialty Medication Orders Linked to Encounter      Flowsheet Row Most Recent Value   Medication #1 etanercept (ENBREL SURECLICK) 50 mg/mL (1 mL) (Order#[id number], Rx#[id number])            Refill Questions - Documented Responses      Flowsheet Row Most Recent Value   Patient Availability and HIPAA Verification    Does patient want to proceed with activity? Yes   HIPAA/medical authority confirmed? Yes   Relationship to patient of person spoken to? Self   Refill Screening Questions    Changes to allergies? No   Changes to medications? No   New conditions since last clinic visit? No   Unplanned office visit, urgent care, ED, or hospital admission in the last 4 weeks? No   How does patient/caregiver feel medication is working? Very good   Financial problems or insurance changes? No   How many doses of your specialty medications were missed in the last 4 weeks? 0   Would patient like to speak to a pharmacist? No   When does the patient need to receive the medication? 05/03/23   Refill Delivery Questions    How will the patient receive the medication? MEDRx   When does the patient need to receive the medication? 05/03/23   Shipping Address Home   Address in [facility] confirmed and updated if neccessary? Yes   Expected Copay ($) 0   Is the patient able to afford the medication copay? Yes   Payment Method zero copay   Days supply of Refill 28   Supplies needed? No supplies needed   Refill activity completed? Yes   Refill activity plan Refill scheduled   Shipment/Pickup Date: 05/01/23            Current Outpatient Medications   Medication Sig    alendronate (FOSAMAX) 35 MG tablet TAKE ONE TABLET BY MOUTH ONCE A WEEK TAKE WITH EIGHT ounces OF WATER ON AN empty stomach    alprazolam (XANAX) 1 MG tablet Take 1 mg by mouth nightly.    atorvastatin (LIPITOR) 80 MG tablet TAKE ONE TABLET BY MOUTH EVERY NIGHT    baclofen (LIORESAL) 10 MG tablet Take 1-2 tablets (10-20 mg total)  by mouth 2 (two) times daily.    ERGOCALCIFEROL, VITAMIN D2, (VITAMIN D ORAL) Take 800 Int'l Units/day by mouth once daily.     etanercept (ENBREL SURECLICK) 50 mg/mL (1 mL) Inject 1 mL (50 mg total) into the skin once a week.    ferrous sulfate (FEOSOL) Tab tablet Take 1 tablet (1 each total) by mouth once daily.    ferrous sulfate (FEOSOL) Tab tablet Take 1 tablet (1 each total) by mouth daily with breakfast.    hydrOXYchloroQUINE (PLAQUENIL) 200 mg tablet Take 1 tablet (200 mg total) by mouth 2 (two) times daily.    isosorbide mononitrate (IMDUR) 60 MG 24 hr tablet Take 1 tablet (60 mg total) by mouth once daily.    lisinopriL 10 MG tablet TAKE ONE TABLET BY MOUTH EVERY EVENING    metoprolol tartrate (LOPRESSOR) 50 MG tablet TAKE ONE TABLET BY MOUTH TWO TIMES A DAY    nitroGLYCERIN (NITROSTAT) 0.4 MG SL tablet Place 1 tablet (0.4 mg total) under the tongue every 5 (five) minutes as needed for Chest pain.    pantoprazole (PROTONIX) 40 MG tablet Take 40 mg by mouth 2 (two) times daily.    sulfaSALAzine (AZULFIDINE) 500 mg Tab TAKE TWO TABLETS BY MOUTH TWO TIMES A DAY   Last reviewed on 1/12/2023  9:05 AM by Mahdav Bowers MD    Review of patient's allergies indicates:  No Known Allergies Last reviewed on  1/12/2023 9:05 AM by Madhav Bowers      Tasks added this encounter   No tasks added.   Tasks due within next 3 months   No tasks due.     Shanika Mueller, PharmD  [facility] - Specialty Pharmacy  [address] [place] [id number]  Phone: [phone]  Fax: [phone]

## 2023-05-18 PROBLEM — I10 ESSENTIAL HYPERTENSION: Status: ACTIVE | Noted: 2023-05-18

## 2023-05-18 PROBLEM — E66.812 CLASS 2 SEVERE OBESITY DUE TO EXCESS CALORIES WITH SERIOUS COMORBIDITY AND BODY MASS INDEX (BMI) OF 38.0 TO 38.9 IN ADULT: Status: ACTIVE | Noted: 2023-05-18

## 2023-05-18 PROBLEM — I20.89 CHRONIC STABLE ANGINA: Status: ACTIVE | Noted: 2023-05-18

## 2023-05-18 PROBLEM — E66.01 CLASS 2 SEVERE OBESITY DUE TO EXCESS CALORIES WITH SERIOUS COMORBIDITY AND BODY MASS INDEX (BMI) OF 38.0 TO 38.9 IN ADULT: Status: ACTIVE | Noted: 2023-05-18

## 2023-05-18 PROBLEM — R06.02 SOB (SHORTNESS OF BREATH): Status: RESOLVED | Noted: 2021-08-25 | Resolved: 2023-05-18

## 2023-05-18 PROBLEM — Z87.891 HISTORY OF TOBACCO ABUSE: Status: ACTIVE | Noted: 2023-05-18

## 2023-05-18 PROBLEM — I51.7 LEFT ATRIAL ENLARGEMENT: Status: ACTIVE | Noted: 2023-05-18

## 2023-05-22 ENCOUNTER — SPECIALTY PHARMACY (OUTPATIENT)
Dept: PHARMACY | Facility: CLINIC | Age: 68
End: 2023-05-22
Payer: MEDICARE

## 2023-05-22 NOTE — TELEPHONE ENCOUNTER
Specialty Pharmacy - Refill Coordination    Specialty Medication Orders Linked to Encounter      Flowsheet Row Most Recent Value   Medication #1 etanercept (ENBREL SURECLICK) 50 mg/mL (1 mL) (Order#[id number], Rx#[id number])            Refill Questions - Documented Responses      Flowsheet Row Most Recent Value   Patient Availability and HIPAA Verification    Does patient want to proceed with activity? Yes   HIPAA/medical authority confirmed? Yes   Relationship to patient of person spoken to? Self   Refill Screening Questions    Changes to allergies? No   Changes to medications? No   New conditions since last clinic visit? No   Unplanned office visit, urgent care, ED, or hospital admission in the last 4 weeks? No   How does patient/caregiver feel medication is working? Good   Financial problems or insurance changes? No   How many doses of your specialty medications were missed in the last 4 weeks? 0   Would patient like to speak to a pharmacist? No   When does the patient need to receive the medication? 05/31/23   Refill Delivery Questions    How will the patient receive the medication? MEDRx   When does the patient need to receive the medication? 05/31/23   Shipping Address Home   Address in [facility] confirmed and updated if neccessary? Yes   Expected Copay ($) 0   Is the patient able to afford the medication copay? Yes   Payment Method zero copay   Days supply of Refill 28   Supplies needed? No supplies needed   Refill activity completed? Yes   Refill activity plan Refill scheduled   Shipment/Pickup Date: 05/29/23            Current Outpatient Medications   Medication Sig    alendronate (FOSAMAX) 35 MG tablet TAKE ONE TABLET BY MOUTH ONCE A WEEK TAKE WITH EIGHT ounces OF WATER ON AN empty stomach    alprazolam (XANAX) 1 MG tablet Take 1 mg by mouth nightly.    aspirin (ECOTRIN) 81 MG EC tablet Take 1 tablet (81 mg total) by mouth once daily.    atorvastatin (LIPITOR) 80 MG tablet Take 1 tablet (80 mg  total) by mouth every evening.    ERGOCALCIFEROL, VITAMIN D2, (VITAMIN D ORAL) Take 800 Int'l Units/day by mouth once daily.     etanercept (ENBREL SURECLICK) 50 mg/mL (1 mL) Inject 1 mL (50 mg total) into the skin once a week.    hydrOXYchloroQUINE (PLAQUENIL) 200 mg tablet Take 1 tablet (200 mg total) by mouth 2 (two) times daily.    isosorbide mononitrate (IMDUR) 60 MG 24 hr tablet Take 1 tablet (60 mg total) by mouth once daily.    lisinopriL (PRINIVIL,ZESTRIL) 20 MG tablet Take 1 tablet (20 mg total) by mouth every evening.    metoprolol tartrate (LOPRESSOR) 50 MG tablet Take 1 tablet (50 mg total) by mouth 2 (two) times daily.    nitroGLYCERIN (NITROSTAT) 0.4 MG SL tablet Place 1 tablet (0.4 mg total) under the tongue every 5 (five) minutes as needed for Chest pain.    pantoprazole (PROTONIX) 40 MG tablet Take 40 mg by mouth 2 (two) times daily.    sulfaSALAzine (AZULFIDINE) 500 mg Tab TAKE TWO TABLETS BY MOUTH TWO TIMES A DAY   Last reviewed on 5/18/2023  2:38 PM by Tam Stevens MD    Review of patient's allergies indicates:  No Known Allergies Last reviewed on  5/18/2023 1:55 PM by Ivonne Edmonds      Tasks added this encounter   No tasks added.   Tasks due within next 3 months   5/22/2023 - Refill Coordination Outreach (1 time occurrence)     Judith Godinez [facility] - Specialty Pharmacy  [address] [place] [address]  Phone: [phone]  Fax: [phone]

## 2023-06-19 ENCOUNTER — SPECIALTY PHARMACY (OUTPATIENT)
Dept: PHARMACY | Facility: CLINIC | Age: 68
End: 2023-06-19
Payer: MEDICARE

## 2023-06-19 NOTE — TELEPHONE ENCOUNTER
Specialty Pharmacy - Refill Coordination    Specialty Medication Orders Linked to Encounter      Flowsheet Row Most Recent Value   Medication #1 etanercept (ENBREL SURECLICK) 50 mg/mL (1 mL) (Order#[id number], Rx#[id number])            Refill Questions - Documented Responses      Flowsheet Row Most Recent Value   Patient Availability and HIPAA Verification    Does patient want to proceed with activity? Yes   HIPAA/medical authority confirmed? Yes   Relationship to patient of person spoken to? Self   Refill Screening Questions    Changes to allergies? No   Changes to medications? No   New conditions since last clinic visit? No   Unplanned office visit, urgent care, ED, or hospital admission in the last 4 weeks? No   How does patient/caregiver feel medication is working? Good   Financial problems or insurance changes? No   How many doses of your specialty medications were missed in the last 4 weeks? 0   Would patient like to speak to a pharmacist? No   When does the patient need to receive the medication? 06/28/23   Refill Delivery Questions    How will the patient receive the medication? MEDRx   When does the patient need to receive the medication? 06/28/23   Shipping Address Home   Address in [facility] confirmed and updated if neccessary? Yes   Expected Copay ($) 0   Is the patient able to afford the medication copay? Yes   Payment Method zero copay   Days supply of Refill 28   Supplies needed? No supplies needed   Refill activity completed? Yes   Refill activity plan Refill scheduled   Shipment/Pickup Date: 06/26/23            Current Outpatient Medications   Medication Sig    alendronate (FOSAMAX) 35 MG tablet TAKE ONE TABLET BY MOUTH ONCE A WEEK TAKE WITH EIGHT ounces OF WATER ON AN empty stomach    alprazolam (XANAX) 1 MG tablet Take 1 mg by mouth nightly.    aspirin (ECOTRIN) 81 MG EC tablet Take 1 tablet (81 mg total) by mouth once daily.    atorvastatin (LIPITOR) 80 MG tablet Take 1 tablet (80 mg  total) by mouth every evening.    ERGOCALCIFEROL, VITAMIN D2, (VITAMIN D ORAL) Take 800 Int'l Units/day by mouth once daily.     etanercept (ENBREL SURECLICK) 50 mg/mL (1 mL) Inject 1 mL (50 mg total) into the skin once a week.    hydrOXYchloroQUINE (PLAQUENIL) 200 mg tablet Take 1 tablet (200 mg total) by mouth 2 (two) times daily.    isosorbide mononitrate (IMDUR) 60 MG 24 hr tablet Take 1 tablet (60 mg total) by mouth once daily.    lisinopriL (PRINIVIL,ZESTRIL) 20 MG tablet Take 1 tablet (20 mg total) by mouth every evening.    metoprolol tartrate (LOPRESSOR) 50 MG tablet Take 1 tablet (50 mg total) by mouth 2 (two) times daily.    nitroGLYCERIN (NITROSTAT) 0.4 MG SL tablet Place 1 tablet (0.4 mg total) under the tongue every 5 (five) minutes as needed for Chest pain.    pantoprazole (PROTONIX) 40 MG tablet Take 40 mg by mouth 2 (two) times daily.    sulfaSALAzine (AZULFIDINE) 500 mg Tab TAKE TWO TABLETS BY MOUTH TWO TIMES A DAY   Last reviewed on 5/18/2023  2:38 PM by Tam Stevens MD    Review of patient's allergies indicates:  No Known Allergies Last reviewed on  5/18/2023 1:55 PM by Ivonne Edmonds      Tasks added this encounter   No tasks added.   Tasks due within next 3 months   No tasks due.     Renetta Godinez [facility] - [facility]  [address] [place] [id number]  Phone: [phone]  Fax: [phone]

## 2023-07-17 ENCOUNTER — SPECIALTY PHARMACY (OUTPATIENT)
Dept: PHARMACY | Facility: CLINIC | Age: 68
End: 2023-07-17
Payer: MEDICARE

## 2023-07-17 NOTE — TELEPHONE ENCOUNTER
Specialty Pharmacy - Refill Coordination    Specialty Medication Orders Linked to Encounter      Flowsheet Row Most Recent Value   Medication #1 etanercept (ENBREL SURECLICK) 50 mg/mL (1 mL) (Order#[id number], Rx#[id number])            Refill Questions - Documented Responses      Flowsheet Row Most Recent Value   Patient Availability and HIPAA Verification    Does patient want to proceed with activity? Yes   HIPAA/medical authority confirmed? Yes   Relationship to patient of person spoken to? Self   Refill Screening Questions    Changes to allergies? No   Changes to medications? No   New conditions since last clinic visit? No   Unplanned office visit, urgent care, ED, or hospital admission in the last 4 weeks? No   How does patient/caregiver feel medication is working? Very good   Financial problems or insurance changes? No   How many doses of your specialty medications were missed in the last 4 weeks? 0   Would patient like to speak to a pharmacist? No   When does the patient need to receive the medication? 07/26/23   Refill Delivery Questions    How will the patient receive the medication? MEDRx   When does the patient need to receive the medication? 07/26/23   Shipping Address Home   Address in [facility] confirmed and updated if neccessary? Yes   Expected Copay ($) 0   Is the patient able to afford the medication copay? Yes   Payment Method zero copay   Days supply of Refill 28   Supplies needed? No supplies needed   Refill activity completed? Yes   Refill activity plan Refill scheduled   Shipment/Pickup Date: 07/24/23            Current Outpatient Medications   Medication Sig    alendronate (FOSAMAX) 35 MG tablet TAKE ONE TABLET BY MOUTH ONCE A WEEK TAKE WITH EIGHT ounces OF WATER ON AN empty stomach    alprazolam (XANAX) 1 MG tablet Take 1 mg by mouth nightly.    aspirin (ECOTRIN) 81 MG EC tablet Take 1 tablet (81 mg total) by mouth once daily.    atorvastatin (LIPITOR) 80 MG tablet Take 1 tablet (80 mg  total) by mouth every evening.    ERGOCALCIFEROL, VITAMIN D2, (VITAMIN D ORAL) Take 800 Int'l Units/day by mouth once daily.     etanercept (ENBREL SURECLICK) 50 mg/mL (1 mL) Inject 1 mL (50 mg total) into the skin once a week.    hydrOXYchloroQUINE (PLAQUENIL) 200 mg tablet Take 1 tablet (200 mg total) by mouth 2 (two) times daily.    isosorbide mononitrate (IMDUR) 60 MG 24 hr tablet Take 1 tablet (60 mg total) by mouth once daily.    lisinopriL (PRINIVIL,ZESTRIL) 20 MG tablet Take 1 tablet (20 mg total) by mouth every evening.    metoprolol tartrate (LOPRESSOR) 50 MG tablet Take 1 tablet (50 mg total) by mouth 2 (two) times daily.    nitroGLYCERIN (NITROSTAT) 0.4 MG SL tablet Place 1 tablet (0.4 mg total) under the tongue every 5 (five) minutes as needed for Chest pain.    pantoprazole (PROTONIX) 40 MG tablet Take 40 mg by mouth 2 (two) times daily.    sulfaSALAzine (AZULFIDINE) 500 mg Tab TAKE TWO TABLETS BY MOUTH TWO TIMES A DAY   Last reviewed on 5/18/2023  2:38 PM by Tam Stevens MD    Review of patient's allergies indicates:  No Known Allergies Last reviewed on  5/18/2023 1:55 PM by Ivonne Edmonds      Tasks added this encounter   No tasks added.   Tasks due within next 3 months   No tasks due.     Renetta Godinez [facility]  [address] [place] [id number]  Phone: [phone]  Fax: [phone]

## 2023-08-14 ENCOUNTER — SPECIALTY PHARMACY (OUTPATIENT)
Dept: PHARMACY | Facility: CLINIC | Age: 68
End: 2023-08-14
Payer: MEDICARE

## 2023-08-14 NOTE — TELEPHONE ENCOUNTER
Patient has injection for 8/16. Will need for 8/23, informed that there are no refills and we will reach out once approved.

## 2023-08-18 NOTE — TELEPHONE ENCOUNTER
Specialty Pharmacy - Refill Coordination    Specialty Medication Orders Linked to Encounter      Flowsheet Row Most Recent Value   Medication #1 etanercept (ENBREL SURECLICK) 50 mg/mL (1 mL) (Order#[id number], Rx#[id number])            Refill Questions - Documented Responses      Flowsheet Row Most Recent Value   Patient Availability and HIPAA Verification    Does patient want to proceed with activity? Yes   HIPAA/medical authority confirmed? Yes   Relationship to patient of person spoken to? Self   Refill Screening Questions    Changes to allergies? No   Changes to medications? No   New conditions since last clinic visit? No   Unplanned office visit, urgent care, ED, or hospital admission in the last 4 weeks? No   How does patient/caregiver feel medication is working? Good   Financial problems or insurance changes? No   How many doses of your specialty medications were missed in the last 4 weeks? 0   Would patient like to speak to a pharmacist? No   When does the patient need to receive the medication? 08/23/23   Refill Delivery Questions    How will the patient receive the medication? MEDRx   When does the patient need to receive the medication? 08/23/23   Shipping Address Home   Address in [facility] confirmed and updated if neccessary? Yes   Expected Copay ($) 0   Is the patient able to afford the medication copay? Yes   Payment Method zero copay   Days supply of Refill 28   Supplies needed? No supplies needed   Refill activity completed? Yes   Refill activity plan Refill scheduled   Shipment/Pickup Date: 08/21/23            Current Outpatient Medications   Medication Sig    alendronate (FOSAMAX) 35 MG tablet TAKE ONE TABLET BY MOUTH ONCE A WEEK TAKE WITH EIGHT ounces OF WATER ON AN empty stomach    alprazolam (XANAX) 1 MG tablet Take 1 mg by mouth nightly.    aspirin (ECOTRIN) 81 MG EC tablet Take 1 tablet (81 mg total) by mouth once daily.    atorvastatin (LIPITOR) 80 MG tablet Take 1 tablet (80 mg  total) by mouth every evening.    ERGOCALCIFEROL, VITAMIN D2, (VITAMIN D ORAL) Take 800 Int'l Units/day by mouth once daily.     etanercept (ENBREL SURECLICK) 50 mg/mL (1 mL) Inject 1 mL (50 mg total) into the skin once a week.    hydrOXYchloroQUINE (PLAQUENIL) 200 mg tablet Take 1 tablet (200 mg total) by mouth 2 (two) times daily.    isosorbide mononitrate (IMDUR) 60 MG 24 hr tablet Take 1 tablet (60 mg total) by mouth once daily.    lisinopriL (PRINIVIL,ZESTRIL) 20 MG tablet Take 1 tablet (20 mg total) by mouth every evening.    metoprolol tartrate (LOPRESSOR) 50 MG tablet Take 1 tablet (50 mg total) by mouth 2 (two) times daily.    nitroGLYCERIN (NITROSTAT) 0.4 MG SL tablet Place 1 tablet (0.4 mg total) under the tongue every 5 (five) minutes as needed for Chest pain.    pantoprazole (PROTONIX) 40 MG tablet Take 40 mg by mouth 2 (two) times daily.    sulfaSALAzine (AZULFIDINE) 500 mg Tab TAKE TWO TABLETS BY MOUTH TWO TIMES A DAY   Last reviewed on 5/18/2023  2:38 PM by Tam Stevens MD    Review of patient's allergies indicates:  No Known Allergies Last reviewed on  5/18/2023 1:55 PM by Ivonne Edmonds      Tasks added this encounter   No tasks added.   Tasks due within next 3 months   No tasks due.     Renetta Godinez [facility] - [facility] Pharm[facility]  [address] [place] [id number]  Phone: [phone]  Fax: [phone]

## 2023-09-11 ENCOUNTER — PATIENT MESSAGE (OUTPATIENT)
Dept: PHARMACY | Facility: CLINIC | Age: 68
End: 2023-09-11
Payer: MEDICARE

## 2023-10-05 ENCOUNTER — E-CONSULT (OUTPATIENT)
Dept: HEMATOLOGY/ONCOLOGY | Facility: CLINIC | Age: 68
End: 2023-10-05
Payer: MEDICARE

## 2023-10-05 DIAGNOSIS — D75.89 MACROCYTOSIS: Primary | ICD-10-CM

## 2023-10-05 PROCEDURE — 99446 PR INTERPROF, PHONE/INTERNET/EHR, CONSULT, 5-10 MINS: ICD-10-PCS | Mod: ,,, | Performed by: INTERNAL MEDICINE

## 2023-10-05 PROCEDURE — 99446 NTRPROF PH1/NTRNET/EHR 5-10: CPT | Mod: ,,, | Performed by: INTERNAL MEDICINE

## 2023-10-05 NOTE — CONSULTS
[facility] - Hematology Oncology  Response for E-Consult     Patient Name: Christen Luciano  MRN: [id number]  Primary Care Provider: Manuel Alvarez MD   Requesting Provider: Madhav Bowers MD  E-Consult to Hemonc  Consult performed by: Renae Gates MD  Consult ordered by: Madhav Bowers MD  Reason for consult: Macrocytosis  Assessment/Recommendations: See Consult           Recommendation:   Recent smear is normal, B12 used to low, now normal.   Repeat TSH, retic, ldh, check SPEP with TRAMAINE, quantitative IG, serum free lyte chains.   Inquire about alcohol use and rule out liver disease.     Contingency: Refer to clinic in case of + myeloma studies, or worsening macrocytosis and anemia in which case she would need a bone marrow biopsy.     Total time of Consultation: 10 minute    I did not speak to the requesting provider verbally about this.     *This eConsult is based on the clinical data available to me and is furnished without benefit of a physical examination. The eConsult will need to be interpreted in light of any clinical issues or changes in patient status not available to me at the time of filing this eConsults. Significant changes in patient condition or level of acuity should result in immediate formal consultation and reevaluation. Please alert me if you have further questions.    Thank you for this eConsult referral.     Renae Gates MD  [facility] - Hematology Oncology

## 2023-11-02 ENCOUNTER — PATIENT MESSAGE (OUTPATIENT)
Dept: ADMINISTRATIVE | Facility: OTHER | Age: 68
End: 2023-11-02
Payer: MEDICARE

## 2023-12-26 ENCOUNTER — PATIENT MESSAGE (OUTPATIENT)
Dept: ADMINISTRATIVE | Facility: OTHER | Age: 68
End: 2023-12-26
Payer: MEDICARE

## 2024-02-22 ENCOUNTER — PATIENT MESSAGE (OUTPATIENT)
Dept: ADMINISTRATIVE | Facility: OTHER | Age: 69
End: 2024-02-22
Payer: MEDICARE

## 2024-03-20 ENCOUNTER — PATIENT MESSAGE (OUTPATIENT)
Dept: ADMINISTRATIVE | Facility: OTHER | Age: 69
End: 2024-03-20
Payer: MEDICARE

## 2024-04-16 ENCOUNTER — PATIENT MESSAGE (OUTPATIENT)
Dept: ADMINISTRATIVE | Facility: OTHER | Age: 69
End: 2024-04-16
Payer: MEDICARE

## 2024-05-14 ENCOUNTER — PATIENT MESSAGE (OUTPATIENT)
Dept: ADMINISTRATIVE | Facility: OTHER | Age: 69
End: 2024-05-14
Payer: MEDICARE

## 2024-05-17 PROBLEM — D53.9 MACROCYTIC ANEMIA: Status: ACTIVE | Noted: 2024-05-17

## 2024-05-17 PROBLEM — R76.8 POSITIVE ANA (ANTINUCLEAR ANTIBODY): Status: ACTIVE | Noted: 2024-05-17

## 2024-05-17 PROBLEM — D84.9 IMMUNOCOMPROMISED: Status: ACTIVE | Noted: 2024-05-17

## 2024-05-17 PROBLEM — Z79.620 ENCOUNTER FOR MONITORING OF ETANERCEPT THERAPY: Status: ACTIVE | Noted: 2024-05-17

## 2024-05-17 PROBLEM — Z51.81 ENCOUNTER FOR MONITORING OF ETANERCEPT THERAPY: Status: ACTIVE | Noted: 2024-05-17

## 2024-06-07 ENCOUNTER — PATIENT MESSAGE (OUTPATIENT)
Dept: ADMINISTRATIVE | Facility: OTHER | Age: 69
End: 2024-06-07
Payer: MEDICARE

## 2024-10-14 ENCOUNTER — PATIENT MESSAGE (OUTPATIENT)
Dept: ADMINISTRATIVE | Facility: OTHER | Age: 69
End: 2024-10-14
Payer: MEDICARE

## 2024-10-17 PROBLEM — I25.118 CORONARY ARTERY DISEASE OF NATIVE ARTERY OF NATIVE HEART WITH STABLE ANGINA PECTORIS: Status: ACTIVE | Noted: 2024-10-17

## 2024-10-17 PROBLEM — I10 PRIMARY HYPERTENSION: Status: ACTIVE | Noted: 2024-10-17

## 2024-10-17 PROBLEM — I10 ESSENTIAL HYPERTENSION: Status: RESOLVED | Noted: 2023-05-18 | Resolved: 2024-10-17

## 2024-10-17 PROBLEM — R29.818 SUSPECTED SLEEP APNEA: Status: ACTIVE | Noted: 2024-10-17

## 2024-10-17 PROBLEM — Z91.89 MULTIPLE RISK FACTORS FOR CORONARY ARTERY DISEASE: Status: ACTIVE | Noted: 2024-10-17

## 2024-10-17 PROBLEM — R94.31 ABNORMAL EKG: Status: ACTIVE | Noted: 2024-10-17

## 2025-04-02 ENCOUNTER — PATIENT MESSAGE (OUTPATIENT)
Dept: ADMINISTRATIVE | Facility: OTHER | Age: 70
End: 2025-04-02
Payer: MEDICARE

## 2025-04-28 ENCOUNTER — PATIENT MESSAGE (OUTPATIENT)
Dept: ADMINISTRATIVE | Facility: OTHER | Age: 70
End: 2025-04-28
Payer: MEDICARE

## 2025-05-23 ENCOUNTER — PATIENT MESSAGE (OUTPATIENT)
Dept: ADMINISTRATIVE | Facility: OTHER | Age: 70
End: 2025-05-23
Payer: MEDICARE

## 2025-06-20 ENCOUNTER — PATIENT MESSAGE (OUTPATIENT)
Dept: ADMINISTRATIVE | Facility: OTHER | Age: 70
End: 2025-06-20
Payer: MEDICARE

## 2025-07-18 ENCOUNTER — PATIENT MESSAGE (OUTPATIENT)
Dept: ADMINISTRATIVE | Facility: OTHER | Age: 70
End: 2025-07-18
Payer: MEDICARE